# Patient Record
Sex: MALE | Race: ASIAN | NOT HISPANIC OR LATINO | Employment: FULL TIME | URBAN - METROPOLITAN AREA
[De-identification: names, ages, dates, MRNs, and addresses within clinical notes are randomized per-mention and may not be internally consistent; named-entity substitution may affect disease eponyms.]

---

## 2017-06-28 ENCOUNTER — TRANSCRIBE ORDERS (OUTPATIENT)
Dept: CARDIOLOGY | Facility: CLINIC | Age: 62
End: 2017-06-28

## 2017-06-28 DIAGNOSIS — R07.2 PRECORDIAL PAIN: Primary | ICD-10-CM

## 2017-06-28 DIAGNOSIS — R06.02 SOB (SHORTNESS OF BREATH): ICD-10-CM

## 2017-06-28 DIAGNOSIS — R77.8 TROPONIN I ABOVE REFERENCE RANGE: ICD-10-CM

## 2017-07-19 ENCOUNTER — HOSPITAL ENCOUNTER (OUTPATIENT)
Dept: CARDIOLOGY | Facility: HOSPITAL | Age: 62
Discharge: HOME | End: 2017-07-19
Attending: INTERNAL MEDICINE

## 2017-07-19 VITALS — DIASTOLIC BLOOD PRESSURE: 76 MMHG | HEART RATE: 52 BPM | SYSTOLIC BLOOD PRESSURE: 146 MMHG

## 2017-07-19 DIAGNOSIS — R06.02 SOB (SHORTNESS OF BREATH): ICD-10-CM

## 2017-07-19 DIAGNOSIS — R77.8 TROPONIN I ABOVE REFERENCE RANGE: ICD-10-CM

## 2017-07-19 DIAGNOSIS — R07.2 PRECORDIAL PAIN: ICD-10-CM

## 2017-07-19 LAB
BH CV STRESS BP STAGE 2: NORMAL
BH CV STRESS BP STAGE 3: NORMAL
BH CV STRESS DURATION MIN STAGE 1: 3
BH CV STRESS DURATION MIN STAGE 2: 3
BH CV STRESS DURATION MIN STAGE 3: 3
BH CV STRESS DURATION SEC STAGE 1: 0
BH CV STRESS DURATION SEC STAGE 2: 0
BH CV STRESS DURATION SEC STAGE 3: 0
BH CV STRESS GRADE STAGE 1: 10
BH CV STRESS GRADE STAGE 2: 12
BH CV STRESS GRADE STAGE 3: 14
BH CV STRESS HR STAGE 1: 98
BH CV STRESS HR STAGE 2: 121
BH CV STRESS HR STAGE 3: 142
BH CV STRESS METS STAGE 1: 4.6
BH CV STRESS METS STAGE 2: 7.5
BH CV STRESS METS STAGE 3: 10.2
BH CV STRESS PROTOCOL 1: NORMAL
BH CV STRESS RECOVERY BP: NORMAL MMHG
BH CV STRESS RECOVERY HR: 77 BPM
BH CV STRESS SPEED STAGE 1: 1.7
BH CV STRESS SPEED STAGE 2: 2.5
BH CV STRESS SPEED STAGE 3: 3.4
BH CV STRESS STAGE 1: 1
BH CV STRESS STAGE 2: 2
BH CV STRESS STAGE 3: 3
LV EF NUC BP: 64 %
MAXIMAL PREDICTED HEART RATE: 158 BPM
PERCENT MAX PREDICTED HR: 89.87 %
STRESS BASELINE BP: NORMAL MMHG
STRESS BASELINE HR: 52 BPM
STRESS PERCENT HR: 106 %
STRESS POST ESTIMATED WORKLOAD: 10.3 METS
STRESS POST EXERCISE DUR MIN: 9 MIN
STRESS POST EXERCISE DUR SEC: 0 SEC
STRESS POST PEAK BP: NORMAL MMHG
STRESS POST PEAK HR: 142 BPM
STRESS TARGET HR: 134 BPM

## 2017-07-19 PROCEDURE — 0 TECHNETIUM SESTAMIBI: Performed by: INTERNAL MEDICINE

## 2017-07-19 PROCEDURE — 78452 HT MUSCLE IMAGE SPECT MULT: CPT

## 2017-07-19 PROCEDURE — A9500 TC99M SESTAMIBI: HCPCS | Performed by: INTERNAL MEDICINE

## 2017-07-19 PROCEDURE — 93017 CV STRESS TEST TRACING ONLY: CPT

## 2017-07-19 RX ORDER — VALSARTAN 160 MG/1
160 TABLET ORAL DAILY
COMMUNITY
End: 2020-01-13 | Stop reason: SDUPTHER

## 2017-07-19 RX ORDER — AMLODIPINE BESYLATE 5 MG/1
5 TABLET ORAL DAILY
COMMUNITY
End: 2020-01-13

## 2017-07-19 RX ADMIN — Medication 1 DOSE: at 11:39

## 2017-07-19 RX ADMIN — Medication 1 DOSE: at 08:29

## 2020-01-13 ENCOUNTER — OFFICE VISIT (OUTPATIENT)
Dept: INTERNAL MEDICINE | Facility: CLINIC | Age: 65
End: 2020-01-13

## 2020-01-13 VITALS
DIASTOLIC BLOOD PRESSURE: 80 MMHG | HEIGHT: 71 IN | SYSTOLIC BLOOD PRESSURE: 142 MMHG | WEIGHT: 192 LBS | BODY MASS INDEX: 26.88 KG/M2

## 2020-01-13 DIAGNOSIS — G47.09 OTHER INSOMNIA: ICD-10-CM

## 2020-01-13 DIAGNOSIS — R97.20 INCREASED PROSTATE SPECIFIC ANTIGEN (PSA) VELOCITY: ICD-10-CM

## 2020-01-13 DIAGNOSIS — I10 ESSENTIAL HYPERTENSION: Primary | ICD-10-CM

## 2020-01-13 DIAGNOSIS — Z11.59 NEED FOR HEPATITIS C SCREENING TEST: ICD-10-CM

## 2020-01-13 DIAGNOSIS — R73.03 PREDIABETES: ICD-10-CM

## 2020-01-13 PROCEDURE — 90670 PCV13 VACCINE IM: CPT | Performed by: INTERNAL MEDICINE

## 2020-01-13 PROCEDURE — G0009 ADMIN PNEUMOCOCCAL VACCINE: HCPCS | Performed by: INTERNAL MEDICINE

## 2020-01-13 PROCEDURE — 99203 OFFICE O/P NEW LOW 30 MIN: CPT | Performed by: INTERNAL MEDICINE

## 2020-01-13 RX ORDER — AMLODIPINE BESYLATE 10 MG/1
10 TABLET ORAL DAILY
Qty: 30 TABLET | Refills: 3 | Status: SHIPPED | OUTPATIENT
Start: 2020-01-13 | End: 2021-02-05 | Stop reason: SDUPTHER

## 2020-01-13 RX ORDER — VALSARTAN 160 MG/1
160 TABLET ORAL DAILY
Qty: 30 TABLET | Refills: 3 | Status: SHIPPED | OUTPATIENT
Start: 2020-01-13 | End: 2020-11-20

## 2020-01-13 RX ORDER — ZOLPIDEM TARTRATE 10 MG/1
10 TABLET ORAL NIGHTLY PRN
Qty: 15 TABLET | Refills: 0 | Status: SHIPPED | OUTPATIENT
Start: 2020-01-13 | End: 2020-10-06 | Stop reason: SDUPTHER

## 2020-01-13 NOTE — PROGRESS NOTES
Subjective        Chief Complaint   Patient presents with   • Hypertension     fup bp meds refills            Guzman Sanders is a 64 y.o. male who presents for    Patient Active Problem List   Diagnosis   • Essential hypertension   • Prediabetes   • Increased prostate specific antigen (PSA) velocity   • Other insomnia       History of Present Illness     His BP at home has been 140/80 when he checks it. He plays golf and walks. He plays tennis some. He denies melena, hematochezia, nocturia, chest pain or dyspnea. He has not been checking his sugars. His diet has not been as good with the holidays. He flies to [place] about every 3 months and would like a refill on ambien. He started wearing hearing aids.  No Known Allergies    Current Outpatient Medications on File Prior to Visit   Medication Sig Dispense Refill   • [DISCONTINUED] amLODIPine (NORVASC) 5 MG tablet Take 5 mg by mouth Daily.     • [DISCONTINUED] valsartan (DIOVAN) 160 MG tablet Take 160 mg by mouth Daily.       No current facility-administered medications on file prior to visit.        Past Medical History:   Diagnosis Date   • Allergic    • Erectile dysfunction    • Hypertension    • Hypertriglyceridemia    • Increased prostate specific antigen (PSA) velocity    • Prediabetes        Past Surgical History:   Procedure Laterality Date   • COLONOSCOPY  12/17/2013   • HEMORRHOIDECTOMY     • HERNIA REPAIR         Family History   Problem Relation Age of Onset   • Other Mother         cerebral aneurysm   • Cancer Father         throat       Social History     Socioeconomic History   • Marital status:      Spouse name: Not on file   • Number of children: Not on file   • Years of education: Not on file   • Highest education level: Not on file   Tobacco Use   • Smoking status: Never Smoker   • Smokeless tobacco: Never Used   Substance and Sexual Activity   • Alcohol use: Yes     Frequency: 4 or more times a week     Drinks per session: 1 or 2     Comment:  "one drink per day   • Drug use: Never   • Sexual activity: Never           The following portions of the patient's history were reviewed and updated as appropriate: problem list, allergies, current medications, past medical history, past family history, past social history and past surgical history.    Review of Systems   Respiratory: Negative for shortness of breath.    Cardiovascular: Negative for chest pain.       Immunization History   Administered Date(s) Administered   • Flu Vaccine Split Quad 10/15/2018   • Fluzone High Dose =>65 Years (Vaxcare ONLY) 01/03/2020   • Pneumococcal Conjugate 13-Valent (PCV13) 01/13/2020   • Tdap 01/01/2013   • Zostavax 03/16/2015       Objective   Vitals:    01/13/20 0807   BP: 142/80   Weight: 87.1 kg (192 lb)   Height: 180.3 cm (71\")     Body mass index is 26.78 kg/m².  Physical Exam   Constitutional: He appears well-developed and well-nourished.   HENT:   Head: Normocephalic and atraumatic.   Neck: Carotid bruit is not present.   Cardiovascular: Normal rate, regular rhythm, S1 normal, S2 normal and normal heart sounds.   Pulmonary/Chest: Effort normal and breath sounds normal.   Neurological: He is alert.   Skin: Skin is warm.   Psychiatric: He has a normal mood and affect.   Vitals reviewed.      Procedures    Assessment/Plan   Guzman was seen today for hypertension.    Diagnoses and all orders for this visit:    Essential hypertension  -     Comprehensive Metabolic Panel; Future  -     Lipid Panel With / Chol / HDL Ratio; Future  -     amLODIPine (NORVASC) 10 MG tablet; Take 1 tablet by mouth Daily.  -     valsartan (DIOVAN) 160 MG tablet; Take 1 tablet by mouth Daily.    Prediabetes  -     Hemoglobin A1c; Future    Increased prostate specific antigen (PSA) velocity  -     PSA Screen; Future    Need for hepatitis C screening test  -     Hepatitis C Antibody; Future    Other insomnia  -     zolpidem (AMBIEN) 10 MG tablet; Take 1 tablet by mouth At Night As Needed for " Sleep.    Other orders  -     Pneumococcal Conjugate Vaccine 13-Valent All (PCV13)             Signed for old records. Prevnar. Increase Norvasc. Run HOLLIS; ambien for international travel.    Return in about 4 weeks (around 2/10/2020).

## 2020-01-14 ENCOUNTER — RESULTS ENCOUNTER (OUTPATIENT)
Dept: INTERNAL MEDICINE | Facility: CLINIC | Age: 65
End: 2020-01-14

## 2020-01-14 DIAGNOSIS — R73.03 PREDIABETES: ICD-10-CM

## 2020-01-14 DIAGNOSIS — Z11.59 NEED FOR HEPATITIS C SCREENING TEST: ICD-10-CM

## 2020-01-14 DIAGNOSIS — I10 ESSENTIAL HYPERTENSION: ICD-10-CM

## 2020-01-14 DIAGNOSIS — R97.20 INCREASED PROSTATE SPECIFIC ANTIGEN (PSA) VELOCITY: ICD-10-CM

## 2020-01-27 DIAGNOSIS — G47.09 OTHER INSOMNIA: ICD-10-CM

## 2020-01-27 RX ORDER — ZOLPIDEM TARTRATE 10 MG/1
10 TABLET ORAL NIGHTLY PRN
Qty: 15 TABLET | Refills: 0 | OUTPATIENT
Start: 2020-01-27

## 2020-02-12 ENCOUNTER — APPOINTMENT (OUTPATIENT)
Dept: LAB | Facility: HOSPITAL | Age: 65
End: 2020-02-12

## 2020-02-12 LAB
ALBUMIN SERPL-MCNC: 4.5 G/DL (ref 3.5–5.2)
ALBUMIN/GLOB SERPL: 2 G/DL
ALP SERPL-CCNC: 81 U/L (ref 39–117)
ALT SERPL W P-5'-P-CCNC: 21 U/L (ref 1–41)
ANION GAP SERPL CALCULATED.3IONS-SCNC: 11.2 MMOL/L (ref 5–15)
AST SERPL-CCNC: 18 U/L (ref 1–40)
BILIRUB SERPL-MCNC: 0.5 MG/DL (ref 0.1–1.2)
BUN BLD-MCNC: 13 MG/DL (ref 8–23)
BUN/CREAT SERPL: 14.6 (ref 7–25)
CALCIUM SPEC-SCNC: 9.1 MG/DL (ref 8.6–10.5)
CHLORIDE SERPL-SCNC: 105 MMOL/L (ref 98–107)
CHOLEST SERPL-MCNC: 187 MG/DL (ref 0–200)
CO2 SERPL-SCNC: 24.8 MMOL/L (ref 22–29)
CREAT BLD-MCNC: 0.89 MG/DL (ref 0.76–1.27)
GFR SERPL CREATININE-BSD FRML MDRD: 104 ML/MIN/1.73
GFR SERPL CREATININE-BSD FRML MDRD: 86 ML/MIN/1.73
GLOBULIN UR ELPH-MCNC: 2.2 GM/DL
GLUCOSE BLD-MCNC: 133 MG/DL (ref 65–99)
HBA1C MFR BLD: 6.11 % (ref 4.8–5.6)
HCV AB SER DONR QL: NORMAL
HDLC SERPL QL: 5.05
HDLC SERPL-MCNC: 37 MG/DL (ref 40–60)
LDLC SERPL CALC-MCNC: 118 MG/DL (ref 0–100)
POTASSIUM BLD-SCNC: 3.9 MMOL/L (ref 3.5–5.2)
PROT SERPL-MCNC: 6.7 G/DL (ref 6–8.5)
PSA SERPL-MCNC: 2.22 NG/ML (ref 0–4)
SODIUM BLD-SCNC: 141 MMOL/L (ref 136–145)
TRIGL SERPL-MCNC: 158 MG/DL (ref 0–150)
VLDLC SERPL-MCNC: 31.6 MG/DL (ref 5–40)

## 2020-02-12 PROCEDURE — 36415 COLL VENOUS BLD VENIPUNCTURE: CPT | Performed by: INTERNAL MEDICINE

## 2020-02-12 PROCEDURE — 80053 COMPREHEN METABOLIC PANEL: CPT | Performed by: INTERNAL MEDICINE

## 2020-02-12 PROCEDURE — 80061 LIPID PANEL: CPT | Performed by: INTERNAL MEDICINE

## 2020-02-12 PROCEDURE — 83036 HEMOGLOBIN GLYCOSYLATED A1C: CPT | Performed by: INTERNAL MEDICINE

## 2020-02-12 PROCEDURE — 86803 HEPATITIS C AB TEST: CPT | Performed by: INTERNAL MEDICINE

## 2020-02-12 PROCEDURE — G0103 PSA SCREENING: HCPCS | Performed by: INTERNAL MEDICINE

## 2020-02-17 ENCOUNTER — OFFICE VISIT (OUTPATIENT)
Dept: INTERNAL MEDICINE | Facility: CLINIC | Age: 65
End: 2020-02-17

## 2020-02-17 VITALS
HEIGHT: 71 IN | DIASTOLIC BLOOD PRESSURE: 84 MMHG | SYSTOLIC BLOOD PRESSURE: 128 MMHG | WEIGHT: 197 LBS | BODY MASS INDEX: 27.58 KG/M2

## 2020-02-17 DIAGNOSIS — I10 ESSENTIAL HYPERTENSION: Primary | ICD-10-CM

## 2020-02-17 DIAGNOSIS — R73.03 PREDIABETES: ICD-10-CM

## 2020-02-17 DIAGNOSIS — E78.49 OTHER HYPERLIPIDEMIA: ICD-10-CM

## 2020-02-17 PROCEDURE — 99213 OFFICE O/P EST LOW 20 MIN: CPT | Performed by: INTERNAL MEDICINE

## 2020-02-17 RX ORDER — ASPIRIN 81 MG/1
81 TABLET, CHEWABLE ORAL DAILY
COMMUNITY
End: 2021-12-02

## 2020-02-17 NOTE — PROGRESS NOTES
Subjective        Chief Complaint   Patient presents with   • Hypertension     4 week fup htn lab review            Guzman Sanders is a 64 y.o. male who presents for    Patient Active Problem List   Diagnosis   • Essential hypertension   • Prediabetes   • Other insomnia   • Other hyperlipidemia       History of Present Illness     He checked his BP  twice and he thinks it was 140s/80s.  He denies chest pain or dyspnea. He has had no problems with medication changes. There is no FH of diabetes. He has been eating more rice in the last 3 months.  No Known Allergies    Current Outpatient Medications on File Prior to Visit   Medication Sig Dispense Refill   • amLODIPine (NORVASC) 10 MG tablet Take 1 tablet by mouth Daily. 30 tablet 3   • aspirin 81 MG chewable tablet Chew 81 mg Daily.     • valsartan (DIOVAN) 160 MG tablet Take 1 tablet by mouth Daily. 30 tablet 3   • zolpidem (AMBIEN) 10 MG tablet Take 1 tablet by mouth At Night As Needed for Sleep. 15 tablet 0     No current facility-administered medications on file prior to visit.        Past Medical History:   Diagnosis Date   • Allergic    • Allergic rhinitis    • Benign essential hypertension    • Erectile dysfunction    • Hyperlipidemia    • Hypertension    • Hypertriglyceridemia    • Inability to attain erection    • Increased prostate specific antigen (PSA) velocity    • Prediabetes        Past Surgical History:   Procedure Laterality Date   • COLONOSCOPY  12/17/2013   • HEMORRHOIDECTOMY     • UMBILICAL HERNIA REPAIR         Family History   Problem Relation Age of Onset   • Other Mother         cerebral aneurysm   • Cancer Father         throat       Social History     Socioeconomic History   • Marital status:      Spouse name: Not on file   • Number of children: Not on file   • Years of education: Not on file   • Highest education level: Not on file   Tobacco Use   • Smoking status: Never Smoker   • Smokeless tobacco: Never Used   Substance and Sexual  "Activity   • Alcohol use: Yes     Frequency: 4 or more times a week     Drinks per session: 1 or 2     Comment: one drink per day   • Drug use: Never   • Sexual activity: Never           The following portions of the patient's history were reviewed and updated as appropriate: problem list, allergies, current medications, past medical history, past family history, past social history and past surgical history.    Review of Systems   Respiratory: Negative for shortness of breath.    Cardiovascular: Negative for chest pain.   Gastrointestinal: Negative for abdominal pain.       Immunization History   Administered Date(s) Administered   • DTaP 01/01/2013   • Flu Vaccine Split Quad 10/15/2018   • Fluzone High Dose =>65 Years (Vaxcare ONLY) 01/03/2020   • Pneumococcal Conjugate 13-Valent (PCV13) 01/13/2020   • Tdap 01/01/2013   • Zostavax 03/16/2015       Objective   Vitals:    02/17/20 0757   BP: 128/84   Weight: 89.4 kg (197 lb)   Height: 180.3 cm (71\")     Body mass index is 27.48 kg/m².  Physical Exam   Constitutional: He appears well-developed and well-nourished.   HENT:   Head: Normocephalic and atraumatic.   Neck: Carotid bruit is not present.   Cardiovascular: Normal rate, regular rhythm, S1 normal, S2 normal and normal heart sounds.   Pulmonary/Chest: Effort normal and breath sounds normal.   Genitourinary:   Genitourinary Comments: Declines prostate exam   Neurological: He is alert.   Skin: Skin is warm.   Psychiatric: He has a normal mood and affect.   Vitals reviewed.    Advance Care Planning   ACP discussion was held with the patient during this visit. Patient has an advance directive, copy requested.  Procedures    Assessment/Plan   Guzman was seen today for hypertension.    Diagnoses and all orders for this visit:    Essential hypertension    Prediabetes  -     Hemoglobin A1c; Future    Other hyperlipidemia  -     Comprehensive Metabolic Panel; Future  -     Lipid Panel With / Chol / HDL Ratio; " Future             Reviewed labs. PSA is stable. 10 year ASCVD risk is 16.7; I recc Crestor or Lipitor but he is not inclined at this point. He will upload his living will. BP is better. Discussed decreasing carbs and exercising more.    Return in about 6 months (around 8/17/2020) for Medicare Wellness.

## 2020-06-25 DIAGNOSIS — N52.9 ERECTILE DYSFUNCTION, UNSPECIFIED ERECTILE DYSFUNCTION TYPE: Primary | ICD-10-CM

## 2020-06-25 RX ORDER — SILDENAFIL 100 MG/1
TABLET, FILM COATED ORAL
Qty: 6 TABLET | Refills: 3 | Status: SHIPPED | OUTPATIENT
Start: 2020-06-25 | End: 2020-08-21 | Stop reason: SDUPTHER

## 2020-08-06 ENCOUNTER — RESULTS ENCOUNTER (OUTPATIENT)
Dept: INTERNAL MEDICINE | Facility: CLINIC | Age: 65
End: 2020-08-06

## 2020-08-06 DIAGNOSIS — R73.03 PREDIABETES: ICD-10-CM

## 2020-08-06 DIAGNOSIS — E78.49 OTHER HYPERLIPIDEMIA: ICD-10-CM

## 2020-08-18 ENCOUNTER — LAB (OUTPATIENT)
Dept: LAB | Facility: HOSPITAL | Age: 65
End: 2020-08-18

## 2020-08-18 LAB
ALBUMIN SERPL-MCNC: 4.5 G/DL (ref 3.5–5.2)
ALBUMIN/GLOB SERPL: 2.4 G/DL
ALP SERPL-CCNC: 76 U/L (ref 39–117)
ALT SERPL W P-5'-P-CCNC: 21 U/L (ref 1–41)
ANION GAP SERPL CALCULATED.3IONS-SCNC: 10.2 MMOL/L (ref 5–15)
AST SERPL-CCNC: 16 U/L (ref 1–40)
BILIRUB SERPL-MCNC: 0.5 MG/DL (ref 0–1.2)
BUN SERPL-MCNC: 14 MG/DL (ref 8–23)
BUN/CREAT SERPL: 16.3 (ref 7–25)
CALCIUM SPEC-SCNC: 9.1 MG/DL (ref 8.6–10.5)
CHLORIDE SERPL-SCNC: 107 MMOL/L (ref 98–107)
CHOLEST SERPL-MCNC: 200 MG/DL (ref 0–200)
CO2 SERPL-SCNC: 24.8 MMOL/L (ref 22–29)
CREAT SERPL-MCNC: 0.86 MG/DL (ref 0.76–1.27)
GFR SERPL CREATININE-BSD FRML MDRD: 108 ML/MIN/1.73
GFR SERPL CREATININE-BSD FRML MDRD: 89 ML/MIN/1.73
GLOBULIN UR ELPH-MCNC: 1.9 GM/DL
GLUCOSE SERPL-MCNC: 124 MG/DL (ref 65–99)
HBA1C MFR BLD: 5.8 % (ref 4.8–5.6)
HDLC SERPL QL: 4.88
HDLC SERPL-MCNC: 41 MG/DL (ref 40–60)
LDLC SERPL CALC-MCNC: 122 MG/DL (ref 0–100)
POTASSIUM SERPL-SCNC: 4.1 MMOL/L (ref 3.5–5.2)
PROT SERPL-MCNC: 6.4 G/DL (ref 6–8.5)
SODIUM SERPL-SCNC: 142 MMOL/L (ref 136–145)
TRIGL SERPL-MCNC: 183 MG/DL (ref 0–150)
VLDLC SERPL-MCNC: 36.6 MG/DL (ref 5–40)

## 2020-08-18 PROCEDURE — 36415 COLL VENOUS BLD VENIPUNCTURE: CPT | Performed by: INTERNAL MEDICINE

## 2020-08-18 PROCEDURE — 80053 COMPREHEN METABOLIC PANEL: CPT | Performed by: INTERNAL MEDICINE

## 2020-08-18 PROCEDURE — 83036 HEMOGLOBIN GLYCOSYLATED A1C: CPT | Performed by: INTERNAL MEDICINE

## 2020-08-18 PROCEDURE — 80061 LIPID PANEL: CPT | Performed by: INTERNAL MEDICINE

## 2020-08-21 ENCOUNTER — OFFICE VISIT (OUTPATIENT)
Dept: INTERNAL MEDICINE | Facility: CLINIC | Age: 65
End: 2020-08-21

## 2020-08-21 VITALS
DIASTOLIC BLOOD PRESSURE: 76 MMHG | SYSTOLIC BLOOD PRESSURE: 122 MMHG | TEMPERATURE: 97.9 F | HEIGHT: 71 IN | WEIGHT: 190 LBS | BODY MASS INDEX: 26.6 KG/M2

## 2020-08-21 DIAGNOSIS — R73.03 PREDIABETES: ICD-10-CM

## 2020-08-21 DIAGNOSIS — N52.9 ERECTILE DYSFUNCTION, UNSPECIFIED ERECTILE DYSFUNCTION TYPE: ICD-10-CM

## 2020-08-21 DIAGNOSIS — Z00.00 INITIAL MEDICARE ANNUAL WELLNESS VISIT: Primary | ICD-10-CM

## 2020-08-21 DIAGNOSIS — E78.49 OTHER HYPERLIPIDEMIA: ICD-10-CM

## 2020-08-21 DIAGNOSIS — I10 ESSENTIAL HYPERTENSION: ICD-10-CM

## 2020-08-21 PROCEDURE — G0438 PPPS, INITIAL VISIT: HCPCS | Performed by: INTERNAL MEDICINE

## 2020-08-21 RX ORDER — SILDENAFIL 100 MG/1
TABLET, FILM COATED ORAL
Qty: 20 TABLET | Refills: 3 | Status: SHIPPED | OUTPATIENT
Start: 2020-08-21 | End: 2020-08-21 | Stop reason: SDUPTHER

## 2020-08-21 RX ORDER — ATORVASTATIN CALCIUM 20 MG/1
20 TABLET, FILM COATED ORAL DAILY
Qty: 30 TABLET | Refills: 3 | Status: SHIPPED | OUTPATIENT
Start: 2020-08-21 | End: 2020-08-21 | Stop reason: SDUPTHER

## 2020-08-21 RX ORDER — SILDENAFIL 100 MG/1
TABLET, FILM COATED ORAL
Qty: 20 TABLET | Refills: 3 | Status: SHIPPED | OUTPATIENT
Start: 2020-08-21 | End: 2020-09-15 | Stop reason: SDUPTHER

## 2020-08-21 RX ORDER — ATORVASTATIN CALCIUM 20 MG/1
20 TABLET, FILM COATED ORAL DAILY
Qty: 30 TABLET | Refills: 3 | Status: SHIPPED | OUTPATIENT
Start: 2020-08-21 | End: 2020-11-20

## 2020-08-21 NOTE — PROGRESS NOTES
The ABCs of the Annual Wellness Visit  Subsequent Medicare Wellness Visit    Chief Complaint   Patient presents with   • Medicare Wellness-subsequent   • Hypertension       Subjective   History of Present Illness:  Guzman Sanders is a 64 y.o. male who presents for a Subsequent Medicare Wellness Visit.  He checks his BP and it runs 120/80. He has not been checking his sugars. He is exercising 5 days per week with tennis and walking to play golf. He denies chest pain dyspnea. He has lost 10 pounds on purpose. He is not taking a statin.  HEALTH RISK ASSESSMENT    Recent Hospitalizations:  No hospitalization(s) within the last year.    Current Medical Providers:  Patient Care Team:  Tucker Zuniga MD as PCP - General (Internal Medicine)    Smoking Status:  Social History     Tobacco Use   Smoking Status Never Smoker   Smokeless Tobacco Never Used       Alcohol Consumption:  Social History     Substance and Sexual Activity   Alcohol Use Yes   • Frequency: 4 or more times a week   • Drinks per session: 1 or 2    Comment: one drink per day       Depression Screen:   PHQ-2/PHQ-9 Depression Screening 8/21/2020   Little interest or pleasure in doing things 0   Feeling down, depressed, or hopeless 0   Total Score 0       Fall Risk Screen:  STEADI Fall Risk Assessment was completed, and patient is at LOW risk for falls.Assessment completed on:8/21/2020    Health Habits and Functional and Cognitive Screening:  Functional & Cognitive Status 8/21/2020   Do you have difficulty preparing food and eating? No   Do you have difficulty bathing yourself, getting dressed or grooming yourself? No   Do you have difficulty using the toilet? No   Do you have difficulty moving around from place to place? No   Do you have trouble with steps or getting out of a bed or a chair? No   Current Diet Well Balanced Diet   Dental Exam Up to date   Eye Exam Up to date   Exercise (times per week) 5 times per week   Current Exercise Activities Include  Walking   Do you need help using the phone?  No   Are you deaf or do you have serious difficulty hearing?  No   Do you need help with transportation? No   Do you need help shopping? No   Do you need help preparing meals?  No   Do you need help with housework?  No   Do you need help with laundry? No   Do you need help taking your medications? No   Do you need help managing money? No   Do you ever drive or ride in a car without wearing a seat belt? No   Have you felt unusual stress, anger or loneliness in the last month? No   Who do you live with? Spouse   If you need help, do you have trouble finding someone available to you? No   Have you been bothered in the last four weeks by sexual problems? No   Do you have difficulty concentrating, remembering or making decisions? No         Does the patient have evidence of cognitive impairment? No    Asprin use counseling:Taking ASA appropriately as indicated    Age-appropriate Screening Schedule:  Refer to the list below for future screening recommendations based on patient's age, sex and/or medical conditions. Orders for these recommended tests are listed in the plan section. The patient has been provided with a written plan.    Health Maintenance   Topic Date Due   • ZOSTER VACCINE (2 of 3) 05/11/2015   • INFLUENZA VACCINE  08/01/2020   • LIPID PANEL  08/18/2021   • TDAP/TD VACCINES (2 - Td) 01/01/2023   • COLONOSCOPY  12/17/2023          The following portions of the patient's history were reviewed and updated as appropriate: allergies, current medications, past family history, past medical history, past social history, past surgical history and problem list.    Outpatient Medications Prior to Visit   Medication Sig Dispense Refill   • amLODIPine (NORVASC) 10 MG tablet Take 1 tablet by mouth Daily. 30 tablet 3   • aspirin 81 MG chewable tablet Chew 81 mg Daily.     • valsartan (DIOVAN) 160 MG tablet Take 1 tablet by mouth Daily. 30 tablet 3   • zolpidem (AMBIEN) 10 MG  "tablet Take 1 tablet by mouth At Night As Needed for Sleep. 15 tablet 0   • sildenafil (Viagra) 100 MG tablet Take 1/2 tablet QD PRN 6 tablet 3     No facility-administered medications prior to visit.        Patient Active Problem List   Diagnosis   • Essential hypertension   • Prediabetes   • Other insomnia   • Other hyperlipidemia   • Initial Medicare annual wellness visit       Advanced Care Planning:  ACP discussion was held with the patient during this visit. Patient has an advance directive (not in EMR), copy requested.    Review of Systems   Respiratory: Negative for shortness of breath.    Cardiovascular: Negative for chest pain.       Compared to one year ago, the patient feels his physical health is better.  Compared to one year ago, the patient feels his mental health is the same.    Reviewed chart for potential of high risk medication in the elderly: yes  Reviewed chart for potential of harmful drug interactions in the elderly:yes    Objective         Vitals:    08/21/20 0830   BP: 122/76   Temp: 97.9 °F (36.6 °C)   Weight: 86.2 kg (190 lb)   Height: 180.3 cm (71\")       Body mass index is 26.5 kg/m².  Discussed the patient's BMI with him. The BMI is in the acceptable range.    Physical Exam   Constitutional: He appears well-developed and well-nourished.   Neck: Carotid bruit is not present.   Cardiovascular: Normal rate, regular rhythm and normal heart sounds.   Pulmonary/Chest: Effort normal and breath sounds normal.   Neurological: He is alert.   Skin: Skin is warm.   Psychiatric: He has a normal mood and affect.   Nursing note and vitals reviewed.      Lab Results   Component Value Date    TRIG 183 (H) 08/18/2020    HDL 41 08/18/2020     (H) 08/18/2020    VLDL 36.6 08/18/2020    HGBA1C 5.80 (H) 08/18/2020        Assessment/Plan   Medicare Risks and Personalized Health Plan  CMS Preventative Services Quick Reference  Advance Directive Discussion  Immunizations Discussed/Encouraged (specific " immunizations; Shingrix )    The above risks/problems have been discussed with the patient.  Pertinent information has been shared with the patient in the After Visit Summary.  Follow up plans and orders are seen below in the Assessment/Plan Section.    Diagnoses and all orders for this visit:    1. Initial Medicare annual wellness visit (Primary)    2. Other hyperlipidemia  -     atorvastatin (LIPITOR) 20 MG tablet; Take 1 tablet by mouth Daily.  Dispense: 30 tablet; Refill: 3  -     Comprehensive Metabolic Panel; Future  -     Lipid Panel With / Chol / HDL Ratio; Future    3. Essential hypertension    4. Erectile dysfunction, unspecified erectile dysfunction type  -     sildenafil (Viagra) 100 MG tablet; Take 1/2 tablet QD PRN  Dispense: 20 tablet; Refill: 3    5. Prediabetes  -     Hemoglobin A1c; Future      Follow Up:  Return in about 3 months (around 11/21/2020), or 30 minutes.     An After Visit Summary and PPPS were given to the patient.         Labs reviewed. Recc Shingrix. He is agreeable to a statin; add Lipitor after discussing side effects. BP is good.

## 2020-09-15 DIAGNOSIS — N52.9 ERECTILE DYSFUNCTION, UNSPECIFIED ERECTILE DYSFUNCTION TYPE: ICD-10-CM

## 2020-09-15 RX ORDER — SILDENAFIL 100 MG/1
TABLET, FILM COATED ORAL
Qty: 20 TABLET | Refills: 3 | Status: SHIPPED | OUTPATIENT
Start: 2020-09-15 | End: 2021-08-24 | Stop reason: SDUPTHER

## 2020-09-15 RX ORDER — VALSARTAN 320 MG/1
TABLET ORAL
Qty: 30 TABLET | Refills: 3 | Status: SHIPPED | OUTPATIENT
Start: 2020-09-15 | End: 2021-07-06

## 2020-10-06 DIAGNOSIS — G47.09 OTHER INSOMNIA: ICD-10-CM

## 2020-10-06 RX ORDER — ZOLPIDEM TARTRATE 10 MG/1
10 TABLET ORAL NIGHTLY PRN
Qty: 15 TABLET | Refills: 0 | Status: SHIPPED | OUTPATIENT
Start: 2020-10-06 | End: 2020-11-20

## 2020-11-18 ENCOUNTER — LAB (OUTPATIENT)
Dept: LAB | Facility: HOSPITAL | Age: 65
End: 2020-11-18

## 2020-11-18 LAB
ALBUMIN SERPL-MCNC: 4.4 G/DL (ref 3.5–5.2)
ALBUMIN/GLOB SERPL: 1.8 G/DL
ALP SERPL-CCNC: 80 U/L (ref 39–117)
ALT SERPL W P-5'-P-CCNC: 22 U/L (ref 1–41)
ANION GAP SERPL CALCULATED.3IONS-SCNC: 6.6 MMOL/L (ref 5–15)
AST SERPL-CCNC: 21 U/L (ref 1–40)
BILIRUB SERPL-MCNC: 0.7 MG/DL (ref 0–1.2)
BUN SERPL-MCNC: 7 MG/DL (ref 8–23)
BUN/CREAT SERPL: 7.4 (ref 7–25)
CALCIUM SPEC-SCNC: 8.9 MG/DL (ref 8.6–10.5)
CHLORIDE SERPL-SCNC: 106 MMOL/L (ref 98–107)
CHOLEST SERPL-MCNC: 197 MG/DL (ref 0–200)
CO2 SERPL-SCNC: 29.4 MMOL/L (ref 22–29)
CREAT SERPL-MCNC: 0.94 MG/DL (ref 0.76–1.27)
GFR SERPL CREATININE-BSD FRML MDRD: 80 ML/MIN/1.73
GFR SERPL CREATININE-BSD FRML MDRD: 97 ML/MIN/1.73
GLOBULIN UR ELPH-MCNC: 2.4 GM/DL
GLUCOSE SERPL-MCNC: 138 MG/DL (ref 65–99)
HBA1C MFR BLD: 5.8 % (ref 4.8–5.6)
HDLC SERPL QL: 3.72
HDLC SERPL-MCNC: 53 MG/DL (ref 40–60)
LDLC SERPL CALC-MCNC: 122 MG/DL (ref 0–100)
POTASSIUM SERPL-SCNC: 4.5 MMOL/L (ref 3.5–5.2)
PROT SERPL-MCNC: 6.8 G/DL (ref 6–8.5)
SODIUM SERPL-SCNC: 142 MMOL/L (ref 136–145)
TRIGL SERPL-MCNC: 126 MG/DL (ref 0–150)
VLDLC SERPL-MCNC: 22 MG/DL (ref 5–40)

## 2020-11-18 PROCEDURE — 83036 HEMOGLOBIN GLYCOSYLATED A1C: CPT | Performed by: INTERNAL MEDICINE

## 2020-11-18 PROCEDURE — 80053 COMPREHEN METABOLIC PANEL: CPT | Performed by: INTERNAL MEDICINE

## 2020-11-18 PROCEDURE — 80061 LIPID PANEL: CPT | Performed by: INTERNAL MEDICINE

## 2020-11-18 PROCEDURE — 36415 COLL VENOUS BLD VENIPUNCTURE: CPT | Performed by: INTERNAL MEDICINE

## 2020-11-19 ENCOUNTER — RESULTS ENCOUNTER (OUTPATIENT)
Dept: INTERNAL MEDICINE | Facility: CLINIC | Age: 65
End: 2020-11-19

## 2020-11-19 DIAGNOSIS — E78.49 OTHER HYPERLIPIDEMIA: ICD-10-CM

## 2020-11-19 DIAGNOSIS — R73.03 PREDIABETES: ICD-10-CM

## 2020-11-20 ENCOUNTER — OFFICE VISIT (OUTPATIENT)
Dept: INTERNAL MEDICINE | Facility: CLINIC | Age: 65
End: 2020-11-20

## 2020-11-20 VITALS
WEIGHT: 186 LBS | BODY MASS INDEX: 26.04 KG/M2 | TEMPERATURE: 97 F | SYSTOLIC BLOOD PRESSURE: 126 MMHG | HEIGHT: 71 IN | DIASTOLIC BLOOD PRESSURE: 78 MMHG

## 2020-11-20 DIAGNOSIS — E78.49 OTHER HYPERLIPIDEMIA: ICD-10-CM

## 2020-11-20 DIAGNOSIS — R73.03 PREDIABETES: ICD-10-CM

## 2020-11-20 DIAGNOSIS — G47.09 OTHER INSOMNIA: ICD-10-CM

## 2020-11-20 DIAGNOSIS — Z12.5 SCREENING FOR PROSTATE CANCER: ICD-10-CM

## 2020-11-20 DIAGNOSIS — I10 ESSENTIAL HYPERTENSION: Primary | ICD-10-CM

## 2020-11-20 PROBLEM — Z00.00 INITIAL MEDICARE ANNUAL WELLNESS VISIT: Status: RESOLVED | Noted: 2020-08-21 | Resolved: 2020-11-20

## 2020-11-20 PROCEDURE — 99214 OFFICE O/P EST MOD 30 MIN: CPT | Performed by: INTERNAL MEDICINE

## 2020-11-20 RX ORDER — ROSUVASTATIN CALCIUM 5 MG/1
5 TABLET, COATED ORAL DAILY
Qty: 30 TABLET | Refills: 3 | Status: SHIPPED | OUTPATIENT
Start: 2020-11-20 | End: 2021-01-15 | Stop reason: SDUPTHER

## 2021-01-15 DIAGNOSIS — E78.49 OTHER HYPERLIPIDEMIA: ICD-10-CM

## 2021-01-15 RX ORDER — ROSUVASTATIN CALCIUM 5 MG/1
5 TABLET, COATED ORAL DAILY
Qty: 30 TABLET | Refills: 3 | Status: SHIPPED | OUTPATIENT
Start: 2021-01-15 | End: 2021-05-23 | Stop reason: SDUPTHER

## 2021-02-05 ENCOUNTER — TELEPHONE (OUTPATIENT)
Dept: INTERNAL MEDICINE | Facility: CLINIC | Age: 66
End: 2021-02-05

## 2021-02-05 DIAGNOSIS — I10 ESSENTIAL HYPERTENSION: ICD-10-CM

## 2021-02-05 RX ORDER — AMLODIPINE BESYLATE 10 MG/1
10 TABLET ORAL DAILY
Qty: 30 TABLET | Refills: 3 | Status: SHIPPED | OUTPATIENT
Start: 2021-02-05 | End: 2021-07-06

## 2021-02-05 NOTE — TELEPHONE ENCOUNTER
Caller: [facility] - [place] - [address] [facility] [address] - [phone] [facility] [phone] FX    Relationship: Pharmacy    Best call back number: [phone]    Medication needed:   Requested Prescriptions     Pending Prescriptions Disp Refills   • amLODIPine (NORVASC) 10 MG tablet 30 tablet 3     Sig: Take 1 tablet by mouth Daily.       When do you need the refill by: asap    What details did the patient provide when requesting the medication: [place] [facility] will now be the preferred pharmacy    Does the patient have less than a 3 day supply:  [x] Yes  [] No    What is the patient's preferred pharmacy:     [facility] [place], [place] - [address] [facility] [address] - [phone] [facility] [phone] FX

## 2021-02-12 DIAGNOSIS — Z12.5 SCREENING FOR PROSTATE CANCER: ICD-10-CM

## 2021-02-12 DIAGNOSIS — I10 ESSENTIAL HYPERTENSION: ICD-10-CM

## 2021-02-12 DIAGNOSIS — R73.03 PREDIABETES: ICD-10-CM

## 2021-02-12 DIAGNOSIS — E78.49 OTHER HYPERLIPIDEMIA: ICD-10-CM

## 2021-02-17 ENCOUNTER — LAB (OUTPATIENT)
Dept: LAB | Facility: HOSPITAL | Age: 66
End: 2021-02-17

## 2021-02-17 LAB
ALBUMIN SERPL-MCNC: 4.2 G/DL (ref 3.5–5.2)
ALBUMIN/GLOB SERPL: 2 G/DL
ALP SERPL-CCNC: 79 U/L (ref 39–117)
ALT SERPL W P-5'-P-CCNC: 26 U/L (ref 1–41)
ANION GAP SERPL CALCULATED.3IONS-SCNC: 8.9 MMOL/L (ref 5–15)
AST SERPL-CCNC: 17 U/L (ref 1–40)
BILIRUB SERPL-MCNC: 0.6 MG/DL (ref 0–1.2)
BUN SERPL-MCNC: 19 MG/DL (ref 8–23)
BUN/CREAT SERPL: 20.9 (ref 7–25)
CALCIUM SPEC-SCNC: 8.7 MG/DL (ref 8.6–10.5)
CHLORIDE SERPL-SCNC: 106 MMOL/L (ref 98–107)
CHOLEST SERPL-MCNC: 129 MG/DL (ref 0–200)
CO2 SERPL-SCNC: 26.1 MMOL/L (ref 22–29)
CREAT SERPL-MCNC: 0.91 MG/DL (ref 0.76–1.27)
GFR SERPL CREATININE-BSD FRML MDRD: 101 ML/MIN/1.73
GFR SERPL CREATININE-BSD FRML MDRD: 83 ML/MIN/1.73
GLOBULIN UR ELPH-MCNC: 2.1 GM/DL
GLUCOSE SERPL-MCNC: 125 MG/DL (ref 65–99)
HBA1C MFR BLD: 6 % (ref 4.8–5.6)
HDLC SERPL QL: 2.87
HDLC SERPL-MCNC: 45 MG/DL (ref 40–60)
LDLC SERPL CALC-MCNC: 64 MG/DL (ref 0–100)
POTASSIUM SERPL-SCNC: 4.1 MMOL/L (ref 3.5–5.2)
PROT SERPL-MCNC: 6.3 G/DL (ref 6–8.5)
PSA SERPL-MCNC: 2.2 NG/ML (ref 0–4)
SODIUM SERPL-SCNC: 141 MMOL/L (ref 136–145)
TRIGL SERPL-MCNC: 106 MG/DL (ref 0–150)
VLDLC SERPL-MCNC: 20 MG/DL (ref 5–40)

## 2021-02-17 PROCEDURE — 80053 COMPREHEN METABOLIC PANEL: CPT | Performed by: INTERNAL MEDICINE

## 2021-02-17 PROCEDURE — 83036 HEMOGLOBIN GLYCOSYLATED A1C: CPT | Performed by: INTERNAL MEDICINE

## 2021-02-17 PROCEDURE — 80061 LIPID PANEL: CPT | Performed by: INTERNAL MEDICINE

## 2021-02-17 PROCEDURE — 36415 COLL VENOUS BLD VENIPUNCTURE: CPT

## 2021-02-17 PROCEDURE — G0103 PSA SCREENING: HCPCS | Performed by: INTERNAL MEDICINE

## 2021-02-19 ENCOUNTER — OFFICE VISIT (OUTPATIENT)
Dept: INTERNAL MEDICINE | Facility: CLINIC | Age: 66
End: 2021-02-19

## 2021-02-19 VITALS
BODY MASS INDEX: 26.32 KG/M2 | HEIGHT: 71 IN | SYSTOLIC BLOOD PRESSURE: 122 MMHG | DIASTOLIC BLOOD PRESSURE: 84 MMHG | TEMPERATURE: 97.8 F | WEIGHT: 188 LBS

## 2021-02-19 DIAGNOSIS — R73.03 PREDIABETES: Chronic | ICD-10-CM

## 2021-02-19 DIAGNOSIS — E78.49 OTHER HYPERLIPIDEMIA: Chronic | ICD-10-CM

## 2021-02-19 DIAGNOSIS — R06.83 SNORING: ICD-10-CM

## 2021-02-19 DIAGNOSIS — I10 ESSENTIAL HYPERTENSION: Primary | Chronic | ICD-10-CM

## 2021-02-19 PROCEDURE — 99214 OFFICE O/P EST MOD 30 MIN: CPT | Performed by: INTERNAL MEDICINE

## 2021-02-19 NOTE — PROGRESS NOTES
Subjective        Chief Complaint   Patient presents with   • Hypertension           Guzman Sanders is a 65 y.o. male who presents for    Patient Active Problem List   Diagnosis   • Essential hypertension   • Prediabetes   • Other insomnia   • Other hyperlipidemia       History of Present Illness     He has not been exercising as much with the weather. He has not checked his BP. He denies chest pain, dyspnea, nausea or abdominal pain. He has no side effects with Crestor. He sleeps 6.5 to 7 hours per night. He wakes up around 4 am and he has a hard time falling back asleep. He does not wake up to urinate. He does not report apnea. His wife says he snores. He does not wake up rested. He has never had a sleep study.  No Known Allergies    Current Outpatient Medications on File Prior to Visit   Medication Sig Dispense Refill   • amLODIPine (NORVASC) 10 MG tablet Take 1 tablet by mouth Daily. 30 tablet 3   • aspirin 81 MG chewable tablet Chew 81 mg Daily.     • rosuvastatin (Crestor) 5 MG tablet Take 1 tablet by mouth Daily. 30 tablet 3   • sildenafil (Viagra) 100 MG tablet Take 1/2 tablet QD PRN 20 tablet 3   • valsartan (DIOVAN) 320 MG tablet TAKE 1/2 TABLET BY MOUTH EVERY DAY 30 tablet 3     No current facility-administered medications on file prior to visit.        Past Medical History:   Diagnosis Date   • Allergic rhinitis    • Erectile dysfunction    • Inability to attain erection        Past Surgical History:   Procedure Laterality Date   • COLONOSCOPY  12/17/2013   • HEMORRHOIDECTOMY     • UMBILICAL HERNIA REPAIR         Family History   Problem Relation Age of Onset   • Other Mother         cerebral aneurysm   • Cancer Father         throat       Social History     Socioeconomic History   • Marital status:      Spouse name: Not on file   • Number of children: Not on file   • Years of education: Not on file   • Highest education level: Not on file   Tobacco Use   • Smoking status: Never Smoker   •  "Smokeless tobacco: Never Used   Substance and Sexual Activity   • Alcohol use: Yes     Frequency: 4 or more times a week     Drinks per session: 1 or 2     Comment: one drink per day   • Drug use: Never   • Sexual activity: Never       Reviewed cmp, flp, psa, and a1c.    The following portions of the patient's history were reviewed and updated as appropriate: problem list, allergies, current medications, past medical history, past family history, past social history and past surgical history.    Review of Systems    Immunization History   Administered Date(s) Administered   • COVID-19 (PFIZER) 01/28/2021, 02/17/2021   • DTaP 01/01/2013   • Flu Vaccine Split Quad 10/15/2018   • Fluzone High Dose =>65 Years (Vaxcare ONLY) 01/03/2020, 08/23/2020   • Pneumococcal Conjugate 13-Valent (PCV13) 01/13/2020   • Tdap 01/01/2013   • Zostavax 03/16/2015       Objective   Vitals:    02/19/21 0901   BP: 122/84   Temp: 97.8 °F (36.6 °C)   Weight: 85.3 kg (188 lb)   Height: 180.3 cm (71\")     Body mass index is 26.22 kg/m².  Physical Exam  Vitals signs reviewed.   Constitutional:       Appearance: He is well-developed.   HENT:      Head: Normocephalic and atraumatic.   Cardiovascular:      Rate and Rhythm: Normal rate and regular rhythm.      Heart sounds: Normal heart sounds, S1 normal and S2 normal.   Pulmonary:      Effort: Pulmonary effort is normal.      Breath sounds: Normal breath sounds.   Skin:     General: Skin is warm.   Neurological:      Mental Status: He is alert.   Psychiatric:         Behavior: Behavior normal.         Procedures    Assessment/Plan   Diagnoses and all orders for this visit:    1. Essential hypertension (Primary)    2. Other hyperlipidemia    3. Prediabetes  -     Comprehensive Metabolic Panel; Future  -     Hemoglobin A1c; Future    4. Snoring  -     Ambulatory Referral to Sleep Medicine               LDL is excellent. A1c is stable. BP is good. He will schedule pneumovax 23 in 2 weeks. Get sleep " eval since does not wake up rested.  Return in about 6 months (around 8/19/2021) for Medicare Wellness, Lab Before FUP.

## 2021-03-04 ENCOUNTER — APPOINTMENT (OUTPATIENT)
Dept: SLEEP MEDICINE | Facility: HOSPITAL | Age: 66
End: 2021-03-04

## 2021-05-23 DIAGNOSIS — E78.49 OTHER HYPERLIPIDEMIA: ICD-10-CM

## 2021-05-24 RX ORDER — ROSUVASTATIN CALCIUM 5 MG/1
5 TABLET, COATED ORAL DAILY
Qty: 90 TABLET | Refills: 0 | Status: SHIPPED | OUTPATIENT
Start: 2021-05-24 | End: 2021-08-24

## 2021-07-06 DIAGNOSIS — I10 ESSENTIAL HYPERTENSION: ICD-10-CM

## 2021-07-06 RX ORDER — VALSARTAN 320 MG/1
TABLET ORAL
Qty: 30 TABLET | Refills: 3 | Status: SHIPPED | OUTPATIENT
Start: 2021-07-06 | End: 2021-12-02

## 2021-07-06 RX ORDER — AMLODIPINE BESYLATE 10 MG/1
10 TABLET ORAL DAILY
Qty: 30 TABLET | Refills: 3 | Status: SHIPPED | OUTPATIENT
Start: 2021-07-06 | End: 2021-10-26

## 2021-08-24 DIAGNOSIS — N52.9 ERECTILE DYSFUNCTION, UNSPECIFIED ERECTILE DYSFUNCTION TYPE: ICD-10-CM

## 2021-08-24 DIAGNOSIS — E78.49 OTHER HYPERLIPIDEMIA: ICD-10-CM

## 2021-08-24 RX ORDER — SILDENAFIL 100 MG/1
TABLET, FILM COATED ORAL
Qty: 30 TABLET | Refills: 3 | Status: SHIPPED | OUTPATIENT
Start: 2021-08-24 | End: 2022-02-10 | Stop reason: SDUPTHER

## 2021-08-24 RX ORDER — ROSUVASTATIN CALCIUM 5 MG/1
TABLET, COATED ORAL
Qty: 90 TABLET | Refills: 0 | Status: SHIPPED | OUTPATIENT
Start: 2021-08-24 | End: 2021-12-13 | Stop reason: SDUPTHER

## 2021-09-10 ENCOUNTER — OFFICE VISIT (OUTPATIENT)
Dept: INTERNAL MEDICINE | Facility: CLINIC | Age: 66
End: 2021-09-10

## 2021-09-10 VITALS
TEMPERATURE: 97.8 F | HEIGHT: 71 IN | SYSTOLIC BLOOD PRESSURE: 152 MMHG | BODY MASS INDEX: 25.06 KG/M2 | WEIGHT: 179 LBS | DIASTOLIC BLOOD PRESSURE: 80 MMHG

## 2021-09-10 DIAGNOSIS — I10 ESSENTIAL HYPERTENSION: Chronic | ICD-10-CM

## 2021-09-10 DIAGNOSIS — Z78.9 ALCOHOL USE: ICD-10-CM

## 2021-09-10 DIAGNOSIS — R73.03 PREDIABETES: Primary | Chronic | ICD-10-CM

## 2021-09-10 PROCEDURE — 99214 OFFICE O/P EST MOD 30 MIN: CPT | Performed by: INTERNAL MEDICINE

## 2021-09-10 RX ORDER — HYDROCHLOROTHIAZIDE 12.5 MG/1
12.5 TABLET ORAL DAILY
Qty: 30 TABLET | Refills: 2 | Status: SHIPPED | OUTPATIENT
Start: 2021-09-10 | End: 2021-09-10

## 2021-09-10 NOTE — PROGRESS NOTES
Subjective        Chief Complaint   Patient presents with   • Hypertension           Guzman Sanders is a 66 y.o. male who presents for    Patient Active Problem List   Diagnosis   • Essential hypertension   • Prediabetes   • Other insomnia   • Other hyperlipidemia       History of Present Illness     His BP has been 135/75. He denies chest pain or dyspnea. He saw ortho for his left ankle pain that is going on for a long time. He had an MRI that showed tendonitis. He was prescribed Pennsaid. Distant sounds are too loud. Close sounds are harder to register. He had two matrinis last night. He has increased his alcohol to 3-6 ounces per day in the last 6 months. He is positive on C and G of CAGE questions.   No Known Allergies    Current Outpatient Medications on File Prior to Visit   Medication Sig Dispense Refill   • amLODIPine (NORVASC) 10 MG tablet Take 1 tablet by mouth Daily. 30 tablet 3   • aspirin 81 MG chewable tablet Chew 81 mg Daily.     • rosuvastatin (CRESTOR) 5 MG tablet TAKE ONE TABLET BY MOUTH DAILY 90 tablet 0   • sildenafil (Viagra) 100 MG tablet Take 1/2 tablet QD PRN 30 tablet 3   • valsartan (DIOVAN) 320 MG tablet TAKE 1/2 TABLET BY MOUTH EVERY DAY 30 tablet 3     No current facility-administered medications on file prior to visit.       Past Medical History:   Diagnosis Date   • Allergic rhinitis    • Erectile dysfunction    • Inability to attain erection        Past Surgical History:   Procedure Laterality Date   • COLONOSCOPY  12/17/2013   • HEMORRHOIDECTOMY     • UMBILICAL HERNIA REPAIR         Family History   Problem Relation Age of Onset   • Other Mother         cerebral aneurysm   • Cancer Father         throat       Social History     Socioeconomic History   • Marital status:      Spouse name: Not on file   • Number of children: Not on file   • Years of education: Not on file   • Highest education level: Not on file   Tobacco Use   • Smoking status: Never Smoker   • Smokeless tobacco:  "Never Used   Substance and Sexual Activity   • Alcohol use: Yes     Comment: 3-6 ounces of alcohol per day   • Drug use: Never   • Sexual activity: Never           The following portions of the patient's history were reviewed and updated as appropriate: problem list, allergies, current medications, past medical history, past family history, past social history and past surgical history.    Review of Systems    Immunization History   Administered Date(s) Administered   • COVID-19 (PFIZER) 01/28/2021, 02/17/2021   • DTaP 01/01/2013   • Flu Vaccine Split Quad 10/15/2018   • Fluzone High Dose =>65 Years (Vaxcare ONLY) 01/03/2020, 08/23/2020   • Pneumococcal Conjugate 13-Valent (PCV13) 01/13/2020   • Pneumococcal Polysaccharide (PPSV23) 03/04/2021   • Tdap 01/01/2013   • Zostavax 03/16/2015       Objective   Vitals:    09/10/21 0839   BP: 152/80   Temp: 97.8 °F (36.6 °C)   Weight: 81.2 kg (179 lb)   Height: 180.3 cm (71\")     Body mass index is 24.97 kg/m².  Physical Exam  Vitals reviewed.   Constitutional:       Appearance: He is well-developed.   HENT:      Head: Normocephalic and atraumatic.   Neck:      Vascular: No carotid bruit.   Cardiovascular:      Rate and Rhythm: Normal rate and regular rhythm.      Heart sounds: Normal heart sounds, S1 normal and S2 normal.   Pulmonary:      Effort: Pulmonary effort is normal.      Breath sounds: Normal breath sounds.   Skin:     General: Skin is warm.   Neurological:      Mental Status: He is alert.   Psychiatric:         Behavior: Behavior normal.         Procedures    Assessment/Plan   Diagnoses and all orders for this visit:    1. Prediabetes (Primary)    2. Essential hypertension  -     Discontinue: hydroCHLOROthiazide (HYDRODIURIL) 12.5 MG tablet; Take 1 tablet by mouth Daily.  Dispense: 30 tablet; Refill: 2    3. Alcohol use               He knows he is due for Shingrix. He will get his labs including CMP and A1c. Add HCTZ and discussed side effects; he says he will " stop etoh first instead. Positive on 2 CAGE questions. He says he will stop today. Discussed if he cannot decrease then he should seek assistance.  Return in about 6 weeks (around 10/22/2021) for Medicare Wellness, Lab Before FUP.

## 2021-10-08 ENCOUNTER — PATIENT OUTREACH (OUTPATIENT)
Dept: PHARMACY | Facility: HOSPITAL | Age: 66
End: 2021-10-08

## 2021-10-25 DIAGNOSIS — I10 ESSENTIAL HYPERTENSION: ICD-10-CM

## 2021-10-26 RX ORDER — AMLODIPINE BESYLATE 10 MG/1
10 TABLET ORAL DAILY
Qty: 30 TABLET | Refills: 1 | Status: SHIPPED | OUTPATIENT
Start: 2021-10-26 | End: 2022-01-04

## 2021-11-11 DIAGNOSIS — G47.25 JET LAG: Primary | ICD-10-CM

## 2021-11-11 RX ORDER — ZOLPIDEM TARTRATE 10 MG/1
10 TABLET ORAL NIGHTLY PRN
Qty: 10 TABLET | Refills: 0 | Status: SHIPPED | OUTPATIENT
Start: 2021-11-11 | End: 2022-02-21

## 2021-11-30 DIAGNOSIS — I10 ESSENTIAL HYPERTENSION: ICD-10-CM

## 2021-11-30 DIAGNOSIS — R73.03 PREDIABETES: Primary | ICD-10-CM

## 2021-12-02 ENCOUNTER — LAB (OUTPATIENT)
Dept: LAB | Facility: HOSPITAL | Age: 66
End: 2021-12-02

## 2021-12-02 ENCOUNTER — OFFICE VISIT (OUTPATIENT)
Dept: INTERNAL MEDICINE | Facility: CLINIC | Age: 66
End: 2021-12-02

## 2021-12-02 VITALS
TEMPERATURE: 97.5 F | DIASTOLIC BLOOD PRESSURE: 84 MMHG | WEIGHT: 205 LBS | BODY MASS INDEX: 28.7 KG/M2 | SYSTOLIC BLOOD PRESSURE: 136 MMHG | HEIGHT: 71 IN

## 2021-12-02 DIAGNOSIS — R73.03 PREDIABETES: Chronic | ICD-10-CM

## 2021-12-02 DIAGNOSIS — Z00.00 MEDICARE ANNUAL WELLNESS VISIT, SUBSEQUENT: Primary | ICD-10-CM

## 2021-12-02 DIAGNOSIS — I10 ESSENTIAL HYPERTENSION: Chronic | ICD-10-CM

## 2021-12-02 LAB
ALBUMIN SERPL-MCNC: 4.5 G/DL (ref 3.5–5.2)
ALBUMIN/GLOB SERPL: 1.8 G/DL
ALP SERPL-CCNC: 83 U/L (ref 39–117)
ALT SERPL W P-5'-P-CCNC: 33 U/L (ref 1–41)
ANION GAP SERPL CALCULATED.3IONS-SCNC: 8.1 MMOL/L (ref 5–15)
AST SERPL-CCNC: 20 U/L (ref 1–40)
BILIRUB SERPL-MCNC: 0.5 MG/DL (ref 0–1.2)
BUN SERPL-MCNC: 12 MG/DL (ref 8–23)
BUN/CREAT SERPL: 13 (ref 7–25)
CALCIUM SPEC-SCNC: 9.4 MG/DL (ref 8.6–10.5)
CHLORIDE SERPL-SCNC: 106 MMOL/L (ref 98–107)
CO2 SERPL-SCNC: 28.9 MMOL/L (ref 22–29)
CREAT SERPL-MCNC: 0.92 MG/DL (ref 0.76–1.27)
GFR SERPL CREATININE-BSD FRML MDRD: 82 ML/MIN/1.73
GFR SERPL CREATININE-BSD FRML MDRD: 99 ML/MIN/1.73
GLOBULIN UR ELPH-MCNC: 2.5 GM/DL
GLUCOSE SERPL-MCNC: 119 MG/DL (ref 65–99)
HBA1C MFR BLD: 6.1 % (ref 4.8–5.6)
POTASSIUM SERPL-SCNC: 4.2 MMOL/L (ref 3.5–5.2)
PROT SERPL-MCNC: 7 G/DL (ref 6–8.5)
SODIUM SERPL-SCNC: 143 MMOL/L (ref 136–145)

## 2021-12-02 PROCEDURE — 1159F MED LIST DOCD IN RCRD: CPT | Performed by: INTERNAL MEDICINE

## 2021-12-02 PROCEDURE — 99213 OFFICE O/P EST LOW 20 MIN: CPT | Performed by: INTERNAL MEDICINE

## 2021-12-02 PROCEDURE — 80053 COMPREHEN METABOLIC PANEL: CPT | Performed by: INTERNAL MEDICINE

## 2021-12-02 PROCEDURE — 36415 COLL VENOUS BLD VENIPUNCTURE: CPT | Performed by: INTERNAL MEDICINE

## 2021-12-02 PROCEDURE — G0439 PPPS, SUBSEQ VISIT: HCPCS | Performed by: INTERNAL MEDICINE

## 2021-12-02 PROCEDURE — 83036 HEMOGLOBIN GLYCOSYLATED A1C: CPT | Performed by: INTERNAL MEDICINE

## 2021-12-02 PROCEDURE — 1170F FXNL STATUS ASSESSED: CPT | Performed by: INTERNAL MEDICINE

## 2021-12-02 RX ORDER — VALSARTAN 320 MG/1
320 TABLET ORAL DAILY
Qty: 90 TABLET | Refills: 1 | Status: SHIPPED | OUTPATIENT
Start: 2021-12-02 | End: 2022-03-15

## 2021-12-02 NOTE — PROGRESS NOTES
The ABCs of the Annual Wellness Visit  Subsequent Medicare Wellness Visit    Chief Complaint   Patient presents with   • Medicare Wellness-subsequent      Subjective    History of Present Illness:  Guzman Sanders is a 66 y.o. male who presents for a Subsequent Medicare Wellness Visit.  He has been feeling good. He denies chest pain or dyspnea. He has been checking his BP and it has been 145/85. He  Has decreased his alcohol to 1-2 ounces per day. He exercises 2 days per week.   The following portions of the patient's history were reviewed and   updated as appropriate: allergies, current medications, past family history, past medical history, past social history, past surgical history and problem list.    Compared to one year ago, the patient feels his physical   health is the same.    Compared to one year ago, the patient feels his mental   health is the same.    Recent Hospitalizations:  He was not admitted to the hospital during the last year.       Current Medical Providers:  Patient Care Team:  Tucker Zuniga MD as PCP - General (Internal Medicine)    Outpatient Medications Prior to Visit   Medication Sig Dispense Refill   • amLODIPine (NORVASC) 10 MG tablet Take 1 tablet by mouth Daily. 30 tablet 1   • rosuvastatin (CRESTOR) 5 MG tablet TAKE ONE TABLET BY MOUTH DAILY 90 tablet 0   • sildenafil (Viagra) 100 MG tablet Take 1/2 tablet QD PRN 30 tablet 3   • zolpidem (AMBIEN) 10 MG tablet Take 1 tablet by mouth At Night As Needed for Sleep. 10 tablet 0   • aspirin 81 MG chewable tablet Chew 81 mg Daily.     • valsartan (DIOVAN) 320 MG tablet TAKE 1/2 TABLET BY MOUTH EVERY DAY 30 tablet 3     No facility-administered medications prior to visit.       No opioid medication identified on active medication list. I have reviewed chart for other potential  high risk medication/s and harmful drug interactions in the elderly.          Aspirin is on active medication list. Aspirin use is not indicated based on review of  "current medical condition/s. Risk of harm outweighs potential benefits. Patient instructed to discontinue this medication.  .      Patient Active Problem List   Diagnosis   • Essential hypertension   • Prediabetes   • Other insomnia   • Other hyperlipidemia     Advance Care Planning  Advance Directive is not on file.  ACP discussion was held with the patient during this visit. Patient has an advance directive (not in EMR), copy requested.          Objective    Vitals:    12/02/21 0755   BP: 136/84   Temp: 97.5 °F (36.4 °C)   Weight: 93 kg (205 lb)   Height: 180.3 cm (71\")     BMI Readings from Last 1 Encounters:   12/02/21 28.59 kg/m²   BMI is above normal parameters. Recommendations include: exercise counseling    Does the patient have evidence of cognitive impairment? No    Physical Exam  Vitals reviewed.   Constitutional:       Appearance: He is well-developed.   HENT:      Head: Normocephalic and atraumatic.   Neck:      Thyroid: No thyroid mass or thyromegaly.      Vascular: No carotid bruit.   Cardiovascular:      Rate and Rhythm: Normal rate and regular rhythm.      Heart sounds: Normal heart sounds, S1 normal and S2 normal.   Pulmonary:      Effort: Pulmonary effort is normal.      Breath sounds: Normal breath sounds.   Lymphadenopathy:      Cervical: No cervical adenopathy.   Skin:     General: Skin is warm.   Neurological:      Mental Status: He is alert.   Psychiatric:         Behavior: Behavior normal.       Lab Results   Component Value Date    HGBA1C 6.10 (H) 12/02/2021            HEALTH RISK ASSESSMENT    Smoking Status:  Social History     Tobacco Use   Smoking Status Never Smoker   Smokeless Tobacco Never Used     Alcohol Consumption:  Social History     Substance and Sexual Activity   Alcohol Use Yes    Comment: 1-2 ounces of alcohol per day     Fall Risk Screen:    STEADI Fall Risk Assessment was completed, and patient is at LOW risk for falls.Assessment completed on:12/2/2021    Depression " Screening:  PHQ-2/PHQ-9 Depression Screening 12/2/2021   Little interest or pleasure in doing things 0   Feeling down, depressed, or hopeless 0   Total Score 0       Health Habits and Functional and Cognitive Screening:  Functional & Cognitive Status 12/2/2021   Do you have difficulty preparing food and eating? No   Do you have difficulty bathing yourself, getting dressed or grooming yourself? No   Do you have difficulty using the toilet? No   Do you have difficulty moving around from place to place? No   Do you have trouble with steps or getting out of a bed or a chair? No   Current Diet Well Balanced Diet   Dental Exam Up to date   Eye Exam Up to date   Exercise (times per week) 3 times per week   Current Exercises Include Walking;Tennis   Current Exercise Activities Include -   Do you need help using the phone?  No   Are you deaf or do you have serious difficulty hearing?  No   Do you need help with transportation? No   Do you need help shopping? No   Do you need help preparing meals?  No   Do you need help with housework?  No   Do you need help with laundry? No   Do you need help taking your medications? No   Do you need help managing money? No   Do you ever drive or ride in a car without wearing a seat belt? No   Have you felt unusual stress, anger or loneliness in the last month? No   Who do you live with? Spouse   If you need help, do you have trouble finding someone available to you? No   Have you been bothered in the last four weeks by sexual problems? No   Do you have difficulty concentrating, remembering or making decisions? No       Age-appropriate Screening Schedule:  Refer to the list below for future screening recommendations based on patient's age, sex and/or medical conditions. Orders for these recommended tests are listed in the plan section. The patient has been provided with a written plan.    Health Maintenance   Topic Date Due   • ZOSTER VACCINE (3 of 3) 12/22/2021   • LIPID PANEL  02/17/2022    • TDAP/TD VACCINES (2 - Td or Tdap) 01/01/2023   • INFLUENZA VACCINE  Completed              Assessment/Plan   CMS Preventative Services Quick Reference  Risk Factors Identified During Encounter  Immunizations Discussed/Encouraged (specific Immunizations; Shingrix  The above risks/problems have been discussed with the patient.  Follow up actions/plans if indicated are seen below in the Assessment/Plan Section.  Pertinent information has been shared with the patient in the After Visit Summary.    Diagnoses and all orders for this visit:    1. Medicare annual wellness visit, subsequent (Primary)    2. Prediabetes  Comments:  Labs pending    3. Essential hypertension  Comments:  Increase Diovan to 320 mg daily  Orders:  -     Basic Metabolic Panel; Future  -     valsartan (Diovan) 320 MG tablet; Take 1 tablet by mouth Daily.  Dispense: 90 tablet; Refill: 1        Follow Up:   Return in about 6 weeks (around 1/13/2022), or 30 minutes.     An After Visit Summary and PPPS were made available to the patient.                 CMP and a1c are pending. Increase Diovan. Discussed decreasing alcohol more and increasing exercise to 150 minutes per week.

## 2021-12-13 ENCOUNTER — TELEPHONE (OUTPATIENT)
Dept: INTERNAL MEDICINE | Facility: CLINIC | Age: 66
End: 2021-12-13

## 2021-12-13 DIAGNOSIS — E78.49 OTHER HYPERLIPIDEMIA: ICD-10-CM

## 2021-12-13 RX ORDER — ROSUVASTATIN CALCIUM 5 MG/1
5 TABLET, COATED ORAL DAILY
Qty: 90 TABLET | Refills: 0 | Status: SHIPPED | OUTPATIENT
Start: 2021-12-13 | End: 2022-10-17 | Stop reason: SDUPTHER

## 2021-12-13 NOTE — TELEPHONE ENCOUNTER
Caller: [facility] [address] - [phone] [facility] [phone] FX    Relationship: Pharmacy        Requested Prescriptions:   Requested Prescriptions      No prescriptions requested or ordered in this encounter    ATORVASTATIN 20 MG    Pharmacy where request should be sent: [place] [id number] [place] [id number] - [phone] [facility] [phone] FX     Additional details provided by patient:   Does the patient have less than a 3 day supply:  [x] Yes  [] No    Tahmina Thomson Rep   12/13/21 13:43 EST

## 2021-12-17 ENCOUNTER — TELEPHONE (OUTPATIENT)
Dept: INTERNAL MEDICINE | Facility: CLINIC | Age: 66
End: 2021-12-17

## 2022-01-04 DIAGNOSIS — I10 ESSENTIAL HYPERTENSION: ICD-10-CM

## 2022-01-04 RX ORDER — AMLODIPINE BESYLATE 10 MG/1
10 TABLET ORAL DAILY
Qty: 30 TABLET | Refills: 9 | Status: SHIPPED | OUTPATIENT
Start: 2022-01-04 | End: 2022-03-15

## 2022-02-08 ENCOUNTER — LAB (OUTPATIENT)
Dept: LAB | Facility: HOSPITAL | Age: 67
End: 2022-02-08

## 2022-02-08 PROCEDURE — 80048 BASIC METABOLIC PNL TOTAL CA: CPT | Performed by: INTERNAL MEDICINE

## 2022-02-10 DIAGNOSIS — N52.9 ERECTILE DYSFUNCTION, UNSPECIFIED ERECTILE DYSFUNCTION TYPE: ICD-10-CM

## 2022-02-11 RX ORDER — SILDENAFIL 100 MG/1
TABLET, FILM COATED ORAL
Qty: 30 TABLET | Refills: 3 | Status: SHIPPED | OUTPATIENT
Start: 2022-02-11 | End: 2022-12-24 | Stop reason: SDUPTHER

## 2022-02-21 ENCOUNTER — OFFICE VISIT (OUTPATIENT)
Dept: INTERNAL MEDICINE | Facility: CLINIC | Age: 67
End: 2022-02-21

## 2022-02-21 VITALS
HEART RATE: 63 BPM | OXYGEN SATURATION: 99 % | DIASTOLIC BLOOD PRESSURE: 80 MMHG | SYSTOLIC BLOOD PRESSURE: 150 MMHG | TEMPERATURE: 98 F

## 2022-02-21 DIAGNOSIS — I10 ESSENTIAL HYPERTENSION: Primary | Chronic | ICD-10-CM

## 2022-02-21 DIAGNOSIS — G47.09 OTHER INSOMNIA: ICD-10-CM

## 2022-02-21 DIAGNOSIS — Z12.5 SCREENING FOR PROSTATE CANCER: ICD-10-CM

## 2022-02-21 DIAGNOSIS — E78.49 OTHER HYPERLIPIDEMIA: Chronic | ICD-10-CM

## 2022-02-21 DIAGNOSIS — R73.03 PREDIABETES: ICD-10-CM

## 2022-02-21 DIAGNOSIS — R09.89 THROAT CLEARING: ICD-10-CM

## 2022-02-21 PROCEDURE — 99214 OFFICE O/P EST MOD 30 MIN: CPT | Performed by: INTERNAL MEDICINE

## 2022-02-21 RX ORDER — ZOLPIDEM TARTRATE 5 MG/1
5 TABLET ORAL NIGHTLY PRN
Qty: 30 TABLET | Refills: 1 | Status: SHIPPED | OUTPATIENT
Start: 2022-02-21 | End: 2022-04-04 | Stop reason: SDUPTHER

## 2022-02-21 RX ORDER — ESOMEPRAZOLE MAGNESIUM 40 MG/1
40 CAPSULE, DELAYED RELEASE ORAL DAILY
Qty: 30 CAPSULE | Refills: 1 | Status: SHIPPED | OUTPATIENT
Start: 2022-02-21 | End: 2022-08-01 | Stop reason: SDUPTHER

## 2022-02-21 RX ORDER — METOPROLOL SUCCINATE 50 MG/1
50 TABLET, EXTENDED RELEASE ORAL DAILY
Qty: 30 TABLET | Refills: 1 | Status: SHIPPED | OUTPATIENT
Start: 2022-02-21 | End: 2022-04-04

## 2022-02-21 NOTE — PROGRESS NOTES
Subjective        Chief Complaint   Patient presents with   • Hypertension           Guzman Sanders is a 66 y.o. male who presents for    Patient Active Problem List   Diagnosis   • Essential hypertension   • Prediabetes   • Other insomnia   • Other hyperlipidemia       History of Present Illness     He had two negative tests for COVID last week. He had a slight headache with scratchy throat one week ago. One was at the [facility] and one was at [facility]. He felt better on Friday. He has played tennis without any issues. He took some Zmax last week. His BP has been 135-140/80. He has lost 8 pounds with using an tae that keeps track of what he eats. He has decreased his alcohol to one ounce per day. He has not been sleeping as well since he got back from [facility]. He has noticed that when he takes Zolpidem 5 mg hs then he sleeps better. He has throat clearing after eating for years. He only has reflux if he eats late.  No Known Allergies    Current Outpatient Medications on File Prior to Visit   Medication Sig Dispense Refill   • amLODIPine (NORVASC) 10 MG tablet Take 1 tablet by mouth Daily. 30 tablet 9   • rosuvastatin (CRESTOR) 5 MG tablet Take 1 tablet by mouth Daily. 90 tablet 0   • sildenafil (Viagra) 100 MG tablet Take 1/2 tablet QD PRN 30 tablet 3   • valsartan (Diovan) 320 MG tablet Take 1 tablet by mouth Daily. 90 tablet 1   • [DISCONTINUED] zolpidem (AMBIEN) 10 MG tablet Take 1 tablet by mouth At Night As Needed for Sleep. 10 tablet 0     No current facility-administered medications on file prior to visit.       Past Medical History:   Diagnosis Date   • Allergic rhinitis    • Erectile dysfunction    • Inability to attain erection        Past Surgical History:   Procedure Laterality Date   • COLONOSCOPY  12/17/2013    repeat 10 years Dr. Valdivia   • HEMORRHOIDECTOMY     • UMBILICAL HERNIA REPAIR         Family History   Problem Relation Age of Onset   • Other Mother         cerebral aneurysm   • Cancer  Father         throat       Social History     Socioeconomic History   • Marital status:    Tobacco Use   • Smoking status: Never Smoker   • Smokeless tobacco: Never Used   Substance and Sexual Activity   • Alcohol use: Yes     Comment: 1-2 ounces of alcohol per day   • Drug use: Never   • Sexual activity: Never           The following portions of the patient's history were reviewed and updated as appropriate: problem list, allergies, current medications, past medical history, past family history, past social history and past surgical history.    Review of Systems    Immunization History   Administered Date(s) Administered   • COVID-19 (PFIZER) PURPLE CAP 01/28/2021, 02/17/2021, 10/05/2021   • DTaP 01/01/2013   • Flu Vaccine Split Quad 10/15/2018   • Fluad Quad 65+ 10/27/2021   • Fluzone High Dose =>65 Years (Vaxcare ONLY) 01/03/2020, 08/23/2020   • Pneumococcal Conjugate 13-Valent (PCV13) 01/13/2020   • Pneumococcal Polysaccharide (PPSV23) 03/04/2021   • Shingrix 10/27/2021   • Tdap 01/01/2013   • Zostavax 03/16/2015       Objective   Vitals:    02/21/22 0815   BP: 150/80   Pulse: 63   Temp: 98 °F (36.7 °C)   SpO2: 99%     There is no height or weight on file to calculate BMI.  Physical Exam  Vitals reviewed.   Constitutional:       Appearance: He is well-developed.   HENT:      Head: Normocephalic and atraumatic.      Mouth/Throat:      Mouth: Mucous membranes are moist.      Pharynx: Oropharynx is clear.   Cardiovascular:      Rate and Rhythm: Normal rate and regular rhythm.      Heart sounds: Normal heart sounds, S1 normal and S2 normal.   Pulmonary:      Effort: Pulmonary effort is normal.      Breath sounds: Normal breath sounds.   Skin:     General: Skin is warm.   Neurological:      Mental Status: He is alert.   Psychiatric:         Behavior: Behavior normal.         Procedures    Assessment/Plan   Diagnoses and all orders for this visit:    1. Essential hypertension (Primary)  -     metoprolol  succinate XL (Toprol XL) 50 MG 24 hr tablet; Take 1 tablet by mouth Daily.  Dispense: 30 tablet; Refill: 1    2. Other hyperlipidemia  -     Comprehensive Metabolic Panel; Future  -     Lipid Panel With / Chol / HDL Ratio; Future    3. Other insomnia  -     zolpidem (AMBIEN) 5 MG tablet; Take 1 tablet by mouth At Night As Needed for Sleep.  Dispense: 30 tablet; Refill: 1    4. Throat clearing  -     esomeprazole (nexIUM) 40 MG capsule; Take 1 capsule by mouth Daily.  Dispense: 30 capsule; Refill: 1    5. Prediabetes  -     Hemoglobin A1c; Future    6. Screening for prostate cancer  -     PSA Screen; Future             Add Toprol. Bring in BP monitor next time. He has decreased his alcohol. Daljit SHAFER. Instructed no etoh when uses Ambien. I will have him sign controlled substance agreement next time. Trial Nexium for throat clearing. Reviewed bmp.    Return in about 6 weeks (around 4/4/2022), or 30 minute, for Lab Before FUP.

## 2022-03-15 DIAGNOSIS — I10 ESSENTIAL HYPERTENSION: ICD-10-CM

## 2022-03-15 DIAGNOSIS — I10 ESSENTIAL HYPERTENSION: Primary | ICD-10-CM

## 2022-03-15 RX ORDER — VALSARTAN 160 MG/1
160 TABLET ORAL 2 TIMES DAILY
Qty: 60 TABLET | Refills: 1 | OUTPATIENT
Start: 2022-03-15

## 2022-03-15 RX ORDER — AMLODIPINE BESYLATE 5 MG/1
TABLET ORAL
Qty: 60 TABLET | Refills: 1 | Status: SHIPPED | OUTPATIENT
Start: 2022-03-15 | End: 2022-10-17 | Stop reason: SDUPTHER

## 2022-03-15 RX ORDER — VALSARTAN 160 MG/1
160 TABLET ORAL 2 TIMES DAILY
Qty: 60 TABLET | Refills: 1 | Status: SHIPPED | OUTPATIENT
Start: 2022-03-15 | End: 2022-04-13 | Stop reason: SDUPTHER

## 2022-03-17 DIAGNOSIS — I10 ESSENTIAL HYPERTENSION: ICD-10-CM

## 2022-03-17 DIAGNOSIS — I10 ESSENTIAL HYPERTENSION: Primary | ICD-10-CM

## 2022-03-17 RX ORDER — HYDROCHLOROTHIAZIDE 12.5 MG/1
12.5 TABLET ORAL DAILY
Qty: 30 TABLET | Refills: 1 | Status: SHIPPED | OUTPATIENT
Start: 2022-03-17 | End: 2022-03-17

## 2022-03-17 RX ORDER — HYDROCHLOROTHIAZIDE 12.5 MG/1
12.5 TABLET ORAL DAILY
Qty: 30 TABLET | Refills: 4 | Status: SHIPPED | OUTPATIENT
Start: 2022-03-17 | End: 2022-08-01 | Stop reason: SDUPTHER

## 2022-03-30 ENCOUNTER — LAB (OUTPATIENT)
Dept: LAB | Facility: HOSPITAL | Age: 67
End: 2022-03-30

## 2022-03-30 PROCEDURE — G0103 PSA SCREENING: HCPCS | Performed by: INTERNAL MEDICINE

## 2022-03-30 PROCEDURE — 83036 HEMOGLOBIN GLYCOSYLATED A1C: CPT | Performed by: INTERNAL MEDICINE

## 2022-03-30 PROCEDURE — 80053 COMPREHEN METABOLIC PANEL: CPT | Performed by: INTERNAL MEDICINE

## 2022-03-30 PROCEDURE — 80061 LIPID PANEL: CPT | Performed by: INTERNAL MEDICINE

## 2022-04-04 ENCOUNTER — OFFICE VISIT (OUTPATIENT)
Dept: INTERNAL MEDICINE | Facility: CLINIC | Age: 67
End: 2022-04-04

## 2022-04-04 VITALS
TEMPERATURE: 97.8 F | HEIGHT: 71 IN | WEIGHT: 188 LBS | SYSTOLIC BLOOD PRESSURE: 128 MMHG | DIASTOLIC BLOOD PRESSURE: 80 MMHG | BODY MASS INDEX: 26.32 KG/M2

## 2022-04-04 DIAGNOSIS — G47.09 OTHER INSOMNIA: ICD-10-CM

## 2022-04-04 DIAGNOSIS — E78.49 OTHER HYPERLIPIDEMIA: Chronic | ICD-10-CM

## 2022-04-04 DIAGNOSIS — I10 ESSENTIAL HYPERTENSION: Chronic | ICD-10-CM

## 2022-04-04 DIAGNOSIS — R73.03 PREDIABETES: Primary | Chronic | ICD-10-CM

## 2022-04-04 PROCEDURE — 99214 OFFICE O/P EST MOD 30 MIN: CPT | Performed by: INTERNAL MEDICINE

## 2022-04-04 RX ORDER — ZOLPIDEM TARTRATE 5 MG/1
5 TABLET ORAL NIGHTLY PRN
Qty: 30 TABLET | Refills: 3 | Status: SHIPPED | OUTPATIENT
Start: 2022-04-04 | End: 2022-06-10 | Stop reason: SDUPTHER

## 2022-04-04 NOTE — PROGRESS NOTES
Subjective        Chief Complaint   Patient presents with   • Hypertension           Guzman Sanders is a 66 y.o. male who presents for    Patient Active Problem List   Diagnosis   • Essential hypertension   • Prediabetes   • Other insomnia   • Other hyperlipidemia       History of Present Illness     He has lost weight with [place] tae. He is exercising almost daily. He denies chest pain. His BP was 128/78 and 121/80. His highest SBP was 138. He uses Ambien for sleep. He is rested when he takes it. He plays tennis twice per week and he golfs and uses the treadmill. His throat clearing is a lot better with Nexium.  No Known Allergies    Current Outpatient Medications on File Prior to Visit   Medication Sig Dispense Refill   • amLODIPine (NORVASC) 5 MG tablet One tab po BID 60 tablet 1   • esomeprazole (nexIUM) 40 MG capsule Take 1 capsule by mouth Daily. 30 capsule 1   • hydroCHLOROthiazide (HYDRODIURIL) 12.5 MG tablet Take 1 tablet by mouth Daily. 30 tablet 4   • rosuvastatin (CRESTOR) 5 MG tablet Take 1 tablet by mouth Daily. 90 tablet 0   • sildenafil (Viagra) 100 MG tablet Take 1/2 tablet QD PRN 30 tablet 3   • valsartan (Diovan) 160 MG tablet Take 1 tablet by mouth 2 (Two) Times a Day. 60 tablet 1   • [DISCONTINUED] zolpidem (AMBIEN) 5 MG tablet Take 1 tablet by mouth At Night As Needed for Sleep. 30 tablet 1   • [DISCONTINUED] metoprolol succinate XL (Toprol XL) 50 MG 24 hr tablet Take 1 tablet by mouth Daily. 30 tablet 1     No current facility-administered medications on file prior to visit.       Past Medical History:   Diagnosis Date   • Allergic rhinitis    • Erectile dysfunction    • Inability to attain erection        Past Surgical History:   Procedure Laterality Date   • COLONOSCOPY  12/17/2013    repeat 10 years Dr. Valdivia   • HEMORRHOIDECTOMY     • UMBILICAL HERNIA REPAIR         Family History   Problem Relation Age of Onset   • Other Mother         cerebral aneurysm   • Cancer Father         throat  "      Social History     Socioeconomic History   • Marital status:    Tobacco Use   • Smoking status: Never Smoker   • Smokeless tobacco: Never Used   Substance and Sexual Activity   • Alcohol use: Yes     Comment: 1-2 ounces of alcohol per day   • Drug use: Never   • Sexual activity: Never           The following portions of the patient's history were reviewed and updated as appropriate: problem list, allergies, current medications, past medical history, past family history, past social history and past surgical history.    Review of Systems    Immunization History   Administered Date(s) Administered   • COVID-19 (PFIZER) PURPLE CAP 01/28/2021, 02/17/2021, 10/05/2021   • DTaP 01/01/2013   • Flu Vaccine Split Quad 10/15/2018   • Fluad Quad 65+ 10/27/2021   • Fluzone High Dose =>65 Years (Vaxcare ONLY) 01/03/2020, 08/23/2020   • Pneumococcal Conjugate 13-Valent (PCV13) 01/13/2020   • Pneumococcal Polysaccharide (PPSV23) 03/04/2021   • Shingrix 10/27/2021   • Tdap 01/01/2013   • Zostavax 03/16/2015       Objective   Vitals:    04/04/22 0847   BP: 128/80   Temp: 97.8 °F (36.6 °C)   Weight: 85.3 kg (188 lb)   Height: 180.3 cm (70.98\")     Body mass index is 26.23 kg/m².  Physical Exam  Vitals reviewed.   Constitutional:       Appearance: He is well-developed.   HENT:      Head: Normocephalic and atraumatic.   Cardiovascular:      Rate and Rhythm: Normal rate and regular rhythm.      Heart sounds: Normal heart sounds, S1 normal and S2 normal.   Pulmonary:      Effort: Pulmonary effort is normal.      Breath sounds: Normal breath sounds.   Skin:     General: Skin is warm.   Neurological:      Mental Status: He is alert.   Psychiatric:         Behavior: Behavior normal.         Procedures    Assessment/Plan   Diagnoses and all orders for this visit:    1. Prediabetes (Primary)  -     Hemoglobin A1c; Future    2. Other hyperlipidemia    3. Essential hypertension  -     Basic Metabolic Panel; Future    4. Other " insomnia  -     zolpidem (AMBIEN) 5 MG tablet; Take 1 tablet by mouth At Night As Needed for Sleep.  Dispense: 30 tablet; Refill: 3               Signed controlled substance agreement for Ambien. HOLLIS higgins. Reviewed cmp, flp, psa, and a1c. He is losing wt on purpose. LDL is at goal. BP is improving. He is doing a great job losing weight.  Return in about 4 months (around 8/4/2022) for Lab Before FUP.

## 2022-04-13 DIAGNOSIS — I10 ESSENTIAL HYPERTENSION: ICD-10-CM

## 2022-04-13 RX ORDER — VALSARTAN 160 MG/1
160 TABLET ORAL 2 TIMES DAILY
Qty: 60 TABLET | Refills: 1 | Status: SHIPPED | OUTPATIENT
Start: 2022-04-13 | End: 2022-06-24 | Stop reason: SDUPTHER

## 2022-06-10 DIAGNOSIS — G47.09 OTHER INSOMNIA: ICD-10-CM

## 2022-06-10 RX ORDER — ZOLPIDEM TARTRATE 5 MG/1
5 TABLET ORAL NIGHTLY PRN
Qty: 30 TABLET | Refills: 3 | Status: SHIPPED | OUTPATIENT
Start: 2022-06-10 | End: 2022-08-15 | Stop reason: SDUPTHER

## 2022-06-24 DIAGNOSIS — I10 ESSENTIAL HYPERTENSION: ICD-10-CM

## 2022-06-24 RX ORDER — VALSARTAN 160 MG/1
160 TABLET ORAL 2 TIMES DAILY
Qty: 180 TABLET | Refills: 1 | Status: SHIPPED | OUTPATIENT
Start: 2022-06-24 | End: 2022-10-17 | Stop reason: SDUPTHER

## 2022-07-26 NOTE — PROGRESS NOTES
Subjective        Chief Complaint   Patient presents with   • Hypertension           Guzman Sanders is a 65 y.o. male who presents for    Patient Active Problem List   Diagnosis   • Essential hypertension   • Prediabetes   • Other insomnia   • Other hyperlipidemia       History of Present Illness     His BP has been 130/70-80. He exercises 4-5 times per week. He plays tennis and golf. He denies chest pain or dyspnea. He does not take Lipitor. He took it for a week. He developed pain in his legs. He can fall asleep okay but wakes up around 2 am and he cannot fall back asleep. He has tried Ambien and he feels groggy the next day. He has two bourbons nightly since the lockdown.   No Known Allergies    Current Outpatient Medications on File Prior to Visit   Medication Sig Dispense Refill   • amLODIPine (NORVASC) 10 MG tablet Take 1 tablet by mouth Daily. 30 tablet 3   • sildenafil (Viagra) 100 MG tablet Take 1/2 tablet QD PRN 20 tablet 3   • valsartan (DIOVAN) 320 MG tablet TAKE 1/2 TABLET BY MOUTH EVERY DAY 30 tablet 3   • [DISCONTINUED] atorvastatin (LIPITOR) 20 MG tablet Take 1 tablet by mouth Daily. 30 tablet 3   • [DISCONTINUED] zolpidem (AMBIEN) 10 MG tablet Take 1 tablet by mouth At Night As Needed for Sleep. 15 tablet 0   • aspirin 81 MG chewable tablet Chew 81 mg Daily.     • [DISCONTINUED] valsartan (DIOVAN) 160 MG tablet Take 1 tablet by mouth Daily. 30 tablet 3     No current facility-administered medications on file prior to visit.        Past Medical History:   Diagnosis Date   • Allergic rhinitis    • Erectile dysfunction    • Inability to attain erection    • Increased prostate specific antigen (PSA) velocity        Past Surgical History:   Procedure Laterality Date   • COLONOSCOPY  12/17/2013   • HEMORRHOIDECTOMY     • UMBILICAL HERNIA REPAIR         Family History   Problem Relation Age of Onset   • Other Mother         cerebral aneurysm   • Cancer Father         throat       Social History  "    Socioeconomic History   • Marital status:      Spouse name: Not on file   • Number of children: Not on file   • Years of education: Not on file   • Highest education level: Not on file   Tobacco Use   • Smoking status: Never Smoker   • Smokeless tobacco: Never Used   Substance and Sexual Activity   • Alcohol use: Yes     Frequency: 4 or more times a week     Drinks per session: 1 or 2     Comment: one drink per day   • Drug use: Never   • Sexual activity: Never           The following portions of the patient's history were reviewed and updated as appropriate: problem list, allergies, current medications, past medical history, past family history, past social history and past surgical history.    Review of Systems   Respiratory: Negative for shortness of breath.    Cardiovascular: Negative for chest pain.       Immunization History   Administered Date(s) Administered   • DTaP 01/01/2013   • Flu Vaccine Split Quad 10/15/2018   • Fluzone High Dose =>65 Years (Vaxcare ONLY) 01/03/2020, 08/23/2020   • Pneumococcal Conjugate 13-Valent (PCV13) 01/13/2020   • Tdap 01/01/2013   • Zostavax 03/16/2015       Objective   Vitals:    11/20/20 0855   BP: 126/78   Temp: 97 °F (36.1 °C)   Weight: 84.4 kg (186 lb)   Height: 180.3 cm (71\")     Body mass index is 25.94 kg/m².  Physical Exam  Vitals signs reviewed.   Constitutional:       Appearance: He is well-developed.   HENT:      Head: Normocephalic and atraumatic.   Cardiovascular:      Rate and Rhythm: Normal rate and regular rhythm.      Heart sounds: Normal heart sounds, S1 normal and S2 normal.   Pulmonary:      Effort: Pulmonary effort is normal.      Breath sounds: Normal breath sounds.   Skin:     General: Skin is warm.   Neurological:      Mental Status: He is alert.   Psychiatric:         Behavior: Behavior normal.         Procedures    Assessment/Plan   Diagnoses and all orders for this visit:    1. Essential hypertension (Primary)  Comments:  BP is " excellent  Orders:  -     Cancel: Comprehensive Metabolic Panel; Future  -     Comprehensive Metabolic Panel; Future    2. Other hyperlipidemia  Comments:  Try small dose Crestor with co q 10.  Orders:  -     rosuvastatin (Crestor) 5 MG tablet; Take 1 tablet by mouth Daily.  Dispense: 30 tablet; Refill: 3  -     Cancel: Lipid Panel With / Chol / HDL Ratio; Future  -     Lipid Panel With / Chol / HDL Ratio; Future    3. Prediabetes  Comments:  a1c is stable  Orders:  -     Cancel: Hemoglobin A1c; Future  -     Hemoglobin A1c; Future    4. Other insomnia  Comments:  Discuss decreasing bourbon as it may be affecting his sleep.    5. Screening for prostate cancer  -     PSA Screen; Future               Reviewed labs. We will try Crestor at smallest dose with 200 mg Co Q10. He is exercising regularly. Pneumovax next time. Stop ambien. BP is good.  Return in about 3 months (around 2/20/2021), or 30 minutes.   Neto

## 2022-08-01 DIAGNOSIS — R73.03 PREDIABETES: Chronic | ICD-10-CM

## 2022-08-01 DIAGNOSIS — I10 ESSENTIAL HYPERTENSION: Chronic | ICD-10-CM

## 2022-08-01 DIAGNOSIS — R09.89 THROAT CLEARING: ICD-10-CM

## 2022-08-01 DIAGNOSIS — I10 ESSENTIAL HYPERTENSION: ICD-10-CM

## 2022-08-01 RX ORDER — ESOMEPRAZOLE MAGNESIUM 40 MG/1
40 CAPSULE, DELAYED RELEASE ORAL DAILY
Qty: 30 CAPSULE | Refills: 1 | Status: SHIPPED | OUTPATIENT
Start: 2022-08-01 | End: 2022-10-17 | Stop reason: SDUPTHER

## 2022-08-01 RX ORDER — HYDROCHLOROTHIAZIDE 12.5 MG/1
12.5 TABLET ORAL DAILY
Qty: 30 TABLET | Refills: 4 | Status: SHIPPED | OUTPATIENT
Start: 2022-08-01 | End: 2022-10-17 | Stop reason: SDUPTHER

## 2022-08-02 ENCOUNTER — LAB (OUTPATIENT)
Dept: LAB | Facility: HOSPITAL | Age: 67
End: 2022-08-02

## 2022-08-02 LAB
ANION GAP SERPL CALCULATED.3IONS-SCNC: 11.6 MMOL/L (ref 5–15)
BUN SERPL-MCNC: 11 MG/DL (ref 8–23)
BUN/CREAT SERPL: 11.7 (ref 7–25)
CALCIUM SPEC-SCNC: 9 MG/DL (ref 8.6–10.5)
CHLORIDE SERPL-SCNC: 103 MMOL/L (ref 98–107)
CO2 SERPL-SCNC: 24.4 MMOL/L (ref 22–29)
CREAT SERPL-MCNC: 0.94 MG/DL (ref 0.76–1.27)
EGFRCR SERPLBLD CKD-EPI 2021: 88.9 ML/MIN/1.73
GLUCOSE SERPL-MCNC: 122 MG/DL (ref 65–99)
HBA1C MFR BLD: 6.1 % (ref 4.8–5.6)
POTASSIUM SERPL-SCNC: 4.5 MMOL/L (ref 3.5–5.2)
SODIUM SERPL-SCNC: 139 MMOL/L (ref 136–145)

## 2022-08-02 PROCEDURE — 80048 BASIC METABOLIC PNL TOTAL CA: CPT | Performed by: INTERNAL MEDICINE

## 2022-08-02 PROCEDURE — 83036 HEMOGLOBIN GLYCOSYLATED A1C: CPT | Performed by: INTERNAL MEDICINE

## 2022-08-02 PROCEDURE — 36415 COLL VENOUS BLD VENIPUNCTURE: CPT

## 2022-08-05 ENCOUNTER — OFFICE VISIT (OUTPATIENT)
Dept: INTERNAL MEDICINE | Facility: CLINIC | Age: 67
End: 2022-08-05

## 2022-08-05 VITALS
HEIGHT: 71 IN | TEMPERATURE: 97.8 F | BODY MASS INDEX: 26.32 KG/M2 | DIASTOLIC BLOOD PRESSURE: 80 MMHG | SYSTOLIC BLOOD PRESSURE: 140 MMHG | HEART RATE: 66 BPM | WEIGHT: 188 LBS

## 2022-08-05 DIAGNOSIS — I10 ESSENTIAL HYPERTENSION: ICD-10-CM

## 2022-08-05 DIAGNOSIS — R73.03 PREDIABETES: Primary | Chronic | ICD-10-CM

## 2022-08-05 PROCEDURE — 99214 OFFICE O/P EST MOD 30 MIN: CPT | Performed by: INTERNAL MEDICINE

## 2022-08-05 NOTE — PROGRESS NOTES
Subjective        Chief Complaint   Patient presents with   • Hypertension           Guzman Sanders is a 66 y.o. male who presents for    Patient Active Problem List   Diagnosis   • Essential hypertension   • Prediabetes   • Other insomnia   • Other hyperlipidemia       History of Present Illness     His BP has been 130-135/80-85. He denies chest pain or dyspnea. He plays tennis twice per and golf twice per week. He had COVID in June and he took Paxlovid without any issues. He ran out of his HCTZ three weeks ago b/c he lost it with travelling. He only takes Crestor a few times per week.  No Known Allergies    Current Outpatient Medications on File Prior to Visit   Medication Sig Dispense Refill   • amLODIPine (NORVASC) 5 MG tablet One tab po BID 60 tablet 1   • esomeprazole (nexIUM) 40 MG capsule Take 1 capsule by mouth Daily. 30 capsule 1   • hydroCHLOROthiazide (HYDRODIURIL) 12.5 MG tablet Take 1 tablet by mouth Daily. 30 tablet 4   • rosuvastatin (CRESTOR) 5 MG tablet Take 1 tablet by mouth Daily. 90 tablet 0   • sildenafil (Viagra) 100 MG tablet Take 1/2 tablet QD PRN 30 tablet 3   • valsartan (DIOVAN) 160 MG tablet Take 1 tablet by mouth 2 (Two) Times a Day. 180 tablet 1   • zolpidem (AMBIEN) 5 MG tablet Take 1 tablet by mouth At Night As Needed for Sleep. 30 tablet 3     No current facility-administered medications on file prior to visit.       Past Medical History:   Diagnosis Date   • Allergic rhinitis    • COVID-19 06/2022   • Erectile dysfunction    • Inability to attain erection        Past Surgical History:   Procedure Laterality Date   • COLONOSCOPY  12/17/2013    repeat 10 years Dr. Valdivia   • HEMORRHOIDECTOMY     • UMBILICAL HERNIA REPAIR         Family History   Problem Relation Age of Onset   • Other Mother         cerebral aneurysm   • Cancer Father         throat       Social History     Socioeconomic History   • Marital status:    Tobacco Use   • Smoking status: Never Smoker   • Smokeless  "tobacco: Never Used   Substance and Sexual Activity   • Alcohol use: Yes     Comment: 1-2 ounces of alcohol per day   • Drug use: Never   • Sexual activity: Never           The following portions of the patient's history were reviewed and updated as appropriate: problem list, allergies, current medications, past medical history, past family history, past social history and past surgical history.    Review of Systems    Immunization History   Administered Date(s) Administered   • COVID-19 (PFIZER) PURPLE CAP 01/28/2021, 02/17/2021, 10/05/2021   • Covid-19 (Pfizer) Gray Cap 04/09/2022   • DTaP 01/01/2013   • Flu Vaccine Split Quad 10/15/2018   • Fluad Quad 65+ 10/27/2021   • Fluzone High Dose =>65 Years (Vaxcare ONLY) 01/03/2020, 08/23/2020   • Pneumococcal Conjugate 13-Valent (PCV13) 01/13/2020   • Pneumococcal Polysaccharide (PPSV23) 03/04/2021   • Shingrix 10/27/2021   • Tdap 01/01/2013   • Zostavax 03/16/2015       Objective   Vitals:    08/05/22 1123   BP: 140/80   Pulse: 66   Temp: 97.8 °F (36.6 °C)   Weight: 85.3 kg (188 lb)   Height: 180.2 cm (70.95\")     Body mass index is 26.26 kg/m².  Physical Exam  Vitals reviewed.   Constitutional:       Appearance: He is well-developed.   HENT:      Head: Normocephalic and atraumatic.   Cardiovascular:      Rate and Rhythm: Normal rate and regular rhythm.      Heart sounds: Normal heart sounds, S1 normal and S2 normal.   Pulmonary:      Effort: Pulmonary effort is normal.      Breath sounds: Normal breath sounds.   Skin:     General: Skin is warm.   Neurological:      Mental Status: He is alert.   Psychiatric:         Behavior: Behavior normal.         Procedures    Assessment & Plan   Diagnoses and all orders for this visit:    1. Prediabetes (Primary)  -     Hemoglobin A1c; Future    2. Essential hypertension  Comments:  He ran out of HCTZ about 3 weeks ago. He is going to  today.  Orders:  -     Comprehensive Metabolic Panel; Future             Reviewed bmp and " a1c. BP is up today since out of HCTZ for 3 weeks. Discussed compliance with Crestor as misses several doses per week.    Return in about 4 months (around 12/5/2022) for Lab Before [facility], Medicare Wellness.

## 2022-08-15 DIAGNOSIS — G47.09 OTHER INSOMNIA: ICD-10-CM

## 2022-08-16 RX ORDER — ZOLPIDEM TARTRATE 5 MG/1
5 TABLET ORAL NIGHTLY PRN
Qty: 30 TABLET | Refills: 3 | Status: SHIPPED | OUTPATIENT
Start: 2022-08-16 | End: 2022-09-17 | Stop reason: SDUPTHER

## 2022-09-17 DIAGNOSIS — G47.09 OTHER INSOMNIA: ICD-10-CM

## 2022-09-19 RX ORDER — ZOLPIDEM TARTRATE 5 MG/1
5 TABLET ORAL NIGHTLY PRN
Qty: 30 TABLET | Refills: 3 | Status: SHIPPED | OUTPATIENT
Start: 2022-09-19 | End: 2022-10-17 | Stop reason: SDUPTHER

## 2022-10-17 DIAGNOSIS — G47.09 OTHER INSOMNIA: ICD-10-CM

## 2022-10-17 DIAGNOSIS — I10 ESSENTIAL HYPERTENSION: ICD-10-CM

## 2022-10-17 DIAGNOSIS — R09.89 THROAT CLEARING: ICD-10-CM

## 2022-10-17 DIAGNOSIS — E78.49 OTHER HYPERLIPIDEMIA: ICD-10-CM

## 2022-10-18 RX ORDER — ROSUVASTATIN CALCIUM 5 MG/1
5 TABLET, COATED ORAL DAILY
Qty: 90 TABLET | Refills: 0 | Status: SHIPPED | OUTPATIENT
Start: 2022-10-18 | End: 2023-01-31

## 2022-10-18 RX ORDER — ESOMEPRAZOLE MAGNESIUM 40 MG/1
40 CAPSULE, DELAYED RELEASE ORAL DAILY
Qty: 30 CAPSULE | Refills: 1 | Status: SHIPPED | OUTPATIENT
Start: 2022-10-18 | End: 2022-12-19

## 2022-10-18 RX ORDER — ZOLPIDEM TARTRATE 5 MG/1
5 TABLET ORAL NIGHTLY PRN
Qty: 30 TABLET | Refills: 3 | Status: SHIPPED | OUTPATIENT
Start: 2022-10-18 | End: 2022-11-21 | Stop reason: SDUPTHER

## 2022-10-18 RX ORDER — HYDROCHLOROTHIAZIDE 12.5 MG/1
12.5 TABLET ORAL DAILY
Qty: 30 TABLET | Refills: 4 | Status: SHIPPED | OUTPATIENT
Start: 2022-10-18

## 2022-10-18 RX ORDER — VALSARTAN 160 MG/1
160 TABLET ORAL 2 TIMES DAILY
Qty: 180 TABLET | Refills: 1 | Status: SHIPPED | OUTPATIENT
Start: 2022-10-18 | End: 2023-02-24 | Stop reason: SDUPTHER

## 2022-10-18 RX ORDER — AMLODIPINE BESYLATE 5 MG/1
TABLET ORAL
Qty: 60 TABLET | Refills: 1 | Status: SHIPPED | OUTPATIENT
Start: 2022-10-18 | End: 2022-12-19

## 2022-11-21 DIAGNOSIS — G47.09 OTHER INSOMNIA: ICD-10-CM

## 2022-11-22 RX ORDER — ZOLPIDEM TARTRATE 5 MG/1
5 TABLET ORAL NIGHTLY PRN
Qty: 30 TABLET | Refills: 3 | Status: SHIPPED | OUTPATIENT
Start: 2022-11-22 | End: 2022-12-24 | Stop reason: SDUPTHER

## 2022-12-19 DIAGNOSIS — R09.89 THROAT CLEARING: ICD-10-CM

## 2022-12-19 DIAGNOSIS — I10 ESSENTIAL HYPERTENSION: ICD-10-CM

## 2022-12-19 RX ORDER — ESOMEPRAZOLE MAGNESIUM 40 MG/1
40 CAPSULE, DELAYED RELEASE ORAL DAILY
Qty: 30 CAPSULE | Refills: 1 | Status: SHIPPED | OUTPATIENT
Start: 2022-12-19

## 2022-12-19 RX ORDER — AMLODIPINE BESYLATE 5 MG/1
TABLET ORAL
Qty: 60 TABLET | Refills: 1 | Status: SHIPPED | OUTPATIENT
Start: 2022-12-19 | End: 2023-02-24 | Stop reason: SDUPTHER

## 2022-12-24 DIAGNOSIS — N52.9 ERECTILE DYSFUNCTION, UNSPECIFIED ERECTILE DYSFUNCTION TYPE: ICD-10-CM

## 2022-12-24 DIAGNOSIS — G47.09 OTHER INSOMNIA: ICD-10-CM

## 2022-12-27 RX ORDER — SILDENAFIL 100 MG/1
TABLET, FILM COATED ORAL
Qty: 30 TABLET | Refills: 3 | Status: SHIPPED | OUTPATIENT
Start: 2022-12-27

## 2022-12-27 RX ORDER — ZOLPIDEM TARTRATE 5 MG/1
5 TABLET ORAL NIGHTLY PRN
Qty: 30 TABLET | Refills: 3 | Status: SHIPPED | OUTPATIENT
Start: 2022-12-27 | End: 2023-01-28 | Stop reason: SDUPTHER

## 2023-01-27 ENCOUNTER — LAB (OUTPATIENT)
Dept: LAB | Facility: HOSPITAL | Age: 68
End: 2023-01-27
Payer: MEDICARE

## 2023-01-27 PROCEDURE — 83036 HEMOGLOBIN GLYCOSYLATED A1C: CPT | Performed by: INTERNAL MEDICINE

## 2023-01-27 PROCEDURE — 80053 COMPREHEN METABOLIC PANEL: CPT | Performed by: INTERNAL MEDICINE

## 2023-01-28 DIAGNOSIS — G47.09 OTHER INSOMNIA: ICD-10-CM

## 2023-01-30 RX ORDER — ZOLPIDEM TARTRATE 5 MG/1
5 TABLET ORAL NIGHTLY PRN
Qty: 30 TABLET | Refills: 3 | Status: SHIPPED | OUTPATIENT
Start: 2023-01-30 | End: 2023-04-03 | Stop reason: SDUPTHER

## 2023-01-31 ENCOUNTER — OFFICE VISIT (OUTPATIENT)
Dept: INTERNAL MEDICINE | Facility: CLINIC | Age: 68
End: 2023-01-31
Payer: MEDICARE

## 2023-01-31 VITALS
HEIGHT: 71 IN | WEIGHT: 198 LBS | SYSTOLIC BLOOD PRESSURE: 122 MMHG | BODY MASS INDEX: 27.72 KG/M2 | TEMPERATURE: 98.4 F | DIASTOLIC BLOOD PRESSURE: 76 MMHG

## 2023-01-31 DIAGNOSIS — Z00.00 ENCOUNTER FOR SUBSEQUENT ANNUAL WELLNESS VISIT (AWV) IN MEDICARE PATIENT: Primary | ICD-10-CM

## 2023-01-31 DIAGNOSIS — R73.03 PREDIABETES: Chronic | ICD-10-CM

## 2023-01-31 DIAGNOSIS — Z12.5 SCREENING FOR PROSTATE CANCER: ICD-10-CM

## 2023-01-31 DIAGNOSIS — I10 ESSENTIAL HYPERTENSION: Chronic | ICD-10-CM

## 2023-01-31 PROCEDURE — 96160 PT-FOCUSED HLTH RISK ASSMT: CPT | Performed by: INTERNAL MEDICINE

## 2023-01-31 PROCEDURE — G0439 PPPS, SUBSEQ VISIT: HCPCS | Performed by: INTERNAL MEDICINE

## 2023-01-31 PROCEDURE — 1170F FXNL STATUS ASSESSED: CPT | Performed by: INTERNAL MEDICINE

## 2023-01-31 PROCEDURE — 1160F RVW MEDS BY RX/DR IN RCRD: CPT | Performed by: INTERNAL MEDICINE

## 2023-01-31 PROCEDURE — 1159F MED LIST DOCD IN RCRD: CPT | Performed by: INTERNAL MEDICINE

## 2023-01-31 NOTE — PROGRESS NOTES
The ABCs of the Annual Wellness Visit  Subsequent Medicare Wellness Visit    Subjective    Neil Sanders is a 67 y.o. male who presents for a Subsequent Medicare Wellness Visit.  His Bp has been 125-135/80. He denies chest pain or dyspnea. He stopped Crestor.  The following portions of the patient's history were reviewed and   updated as appropriate: allergies, current medications, past family history, past medical history, past social history, past surgical history and problem list.    Compared to one year ago, the patient feels his physical   health is better.    Compared to one year ago, the patient feels his mental   health is the same.    Recent Hospitalizations:  He was not admitted to the hospital during the last year.       Current Medical Providers:  Patient Care Team:  Tucker Zuniga MD as PCP - General (Internal Medicine)    Outpatient Medications Prior to Visit   Medication Sig Dispense Refill   • amLODIPine (NORVASC) 5 MG tablet TAKE ONE TABLET BY MOUTH TWICE DAILY 60 tablet 1   • esomeprazole (nexIUM) 40 MG capsule Take 1 capsule by mouth Daily. 30 capsule 1   • hydroCHLOROthiazide (HYDRODIURIL) 12.5 MG tablet Take 1 tablet by mouth Daily. 30 tablet 4   • sildenafil (Viagra) 100 MG tablet Take 1/2 tablet QD PRN 30 tablet 3   • valsartan (DIOVAN) 160 MG tablet Take 1 tablet by mouth 2 (Two) Times a Day. 180 tablet 1   • zolpidem (AMBIEN) 5 MG tablet Take 1 tablet by mouth At Night As Needed for Sleep. 30 tablet 3   • rosuvastatin (CRESTOR) 5 MG tablet Take 1 tablet by mouth Daily. 90 tablet 0     No facility-administered medications prior to visit.       No opioid medication identified on active medication list. I have reviewed chart for other potential  high risk medication/s and harmful drug interactions in the elderly.          Aspirin is not on active medication list.  Aspirin use is not indicated based on review of current medical condition/s. Risk of harm outweighs potential benefits.   ".    Patient Active Problem List   Diagnosis   • Essential hypertension   • Prediabetes   • Other insomnia   • Other hyperlipidemia     Advance Care Planning  Advance Directive is not on file.  ACP discussion was held with the patient during this visit. Patient has an advance directive (not in EMR), copy requested.     Objective    Vitals:    01/31/23 1609   BP: 122/76   Temp: 98.4 °F (36.9 °C)   TempSrc: Temporal   Weight: 89.8 kg (198 lb)   Height: 180.2 cm (70.95\")     Estimated body mass index is 27.65 kg/m² as calculated from the following:    Height as of this encounter: 180.2 cm (70.95\").    Weight as of this encounter: 89.8 kg (198 lb).    BMI is >= 25 and <30. (Overweight) The following options were offered after discussion;: exercise counseling/recommendations      Does the patient have evidence of cognitive impairment? No    Lab Results   Component Value Date    HGBA1C 6.20 (H) 01/27/2023        HEALTH RISK ASSESSMENT    Smoking Status:  Social History     Tobacco Use   Smoking Status Never   Smokeless Tobacco Never     Alcohol Consumption:  Social History     Substance and Sexual Activity   Alcohol Use Yes    Comment: 1-2 ounces of alcohol per day     Fall Risk Screen:    STEADI Fall Risk Assessment was completed, and patient is at LOW risk for falls.Assessment completed on:1/31/2023    Depression Screening:  PHQ-2/PHQ-9 Depression Screening 1/31/2023   Little Interest or Pleasure in Doing Things 0-->not at all   Feeling Down, Depressed or Hopeless 0-->not at all   PHQ-9: Brief Depression Severity Measure Score 0       Health Habits and Functional and Cognitive Screening:  Functional & Cognitive Status 1/31/2023   Do you have difficulty preparing food and eating? No   Do you have difficulty bathing yourself, getting dressed or grooming yourself? No   Do you have difficulty using the toilet? No   Do you have difficulty moving around from place to place? No   Do you have trouble with steps or getting out " of a bed or a chair? No   Current Diet Well Balanced Diet   Dental Exam Up to date   Eye Exam Up to date   Exercise (times per week) 3 times per week   Current Exercises Include Walking   Current Exercise Activities Include -   Do you need help using the phone?  No   Are you deaf or do you have serious difficulty hearing?  Yes   Do you need help with transportation? No   Do you need help shopping? No   Do you need help preparing meals?  No   Do you need help with housework?  No   Do you need help with laundry? No   Do you need help taking your medications? No   Do you need help managing money? No   Do you ever drive or ride in a car without wearing a seat belt? No   Have you felt unusual stress, anger or loneliness in the last month? No   Who do you live with? Spouse   If you need help, do you have trouble finding someone available to you? No   Have you been bothered in the last four weeks by sexual problems? No   Do you have difficulty concentrating, remembering or making decisions? No       Age-appropriate Screening Schedule:  Refer to the list below for future screening recommendations based on patient's age, sex and/or medical conditions. Orders for these recommended tests are listed in the plan section. The patient has been provided with a written plan.    Health Maintenance   Topic Date Due   • ZOSTER VACCINE (3 of 3) 12/22/2021   • TDAP/TD VACCINES (2 - Td or Tdap) 01/01/2023   • LIPID PANEL  03/30/2023   • INFLUENZA VACCINE  Completed                CMS Preventative Services Quick Reference  Risk Factors Identified During Encounter  Immunizations Discussed/Encouraged: Tdap and Shingrix  The above risks/problems have been discussed with the patient.  Pertinent information has been shared with the patient in the After Visit Summary.  An After Visit Summary and PPPS were made available to the patient.    Follow Up:   Next Medicare Wellness visit to be scheduled in 1 year.       Additional E&M Note during same  "encounter follows:  Patient has multiple medical problems which are significant and separately identifiable that require additional work above and beyond the Medicare Wellness Visit.      Chief Complaint  Medicare Wellness-subsequent    Subjective        HPI  Neil Sanders is also being seen today for Medicare Wellness and HTN.  His Bp has been 125-135/80. He denies chest pain or dyspnea. He stopped Crestor.         Objective   Vital Signs:  /76   Temp 98.4 °F (36.9 °C) (Temporal)   Ht 180.2 cm (70.95\")   Wt 89.8 kg (198 lb)   BMI 27.65 kg/m²     Physical Exam  Vitals reviewed.   Constitutional:       Appearance: He is well-developed.   HENT:      Head: Normocephalic and atraumatic.   Eyes:      Extraocular Movements: Extraocular movements intact.      Conjunctiva/sclera: Conjunctivae normal.      Pupils: Pupils are equal, round, and reactive to light.   Neck:      Thyroid: No thyromegaly.      Vascular: No carotid bruit.   Cardiovascular:      Rate and Rhythm: Normal rate and regular rhythm.      Heart sounds: Normal heart sounds, S1 normal and S2 normal. No murmur heard.  Pulmonary:      Effort: Pulmonary effort is normal.      Breath sounds: Normal breath sounds.   Abdominal:      General: There is no distension.      Palpations: Abdomen is soft. There is no mass.      Tenderness: There is no abdominal tenderness. There is no rebound.   Musculoskeletal:      Cervical back: Neck supple.   Lymphadenopathy:      Cervical: No cervical adenopathy.   Skin:     General: Skin is warm.   Neurological:      Mental Status: He is alert.   Psychiatric:         Behavior: Behavior normal.                         Assessment and Plan   Diagnoses and all orders for this visit:    1. Encounter for subsequent annual wellness visit (AWV) in Medicare patient (Primary)    2. Essential hypertension  -     Comprehensive Metabolic Panel; Future  -     Lipid Panel With / Chol / HDL Ratio; Future    3. Prediabetes  -     " Hemoglobin A1c; Future    4. Screening for prostate cancer  -     PSA Screen; Future             Follow Up   Return in about 6 months (around 7/31/2023), or 30 minutes, for Lab Before [facility].  Patient was given instructions and counseling regarding his condition or for health maintenance advice. Please see specific information pulled into the AVS if appropriate.       Reviewed CMP and A1c. He will be due for csope in December. Discussed second Shingrix and Tdap. BP is good.

## 2023-02-24 DIAGNOSIS — I10 ESSENTIAL HYPERTENSION: ICD-10-CM

## 2023-02-24 DIAGNOSIS — G47.09 OTHER INSOMNIA: ICD-10-CM

## 2023-02-24 RX ORDER — AMLODIPINE BESYLATE 5 MG/1
TABLET ORAL
Qty: 60 TABLET | Refills: 1 | Status: SHIPPED | OUTPATIENT
Start: 2023-02-24

## 2023-02-24 RX ORDER — ZOLPIDEM TARTRATE 5 MG/1
5 TABLET ORAL NIGHTLY PRN
Qty: 30 TABLET | Refills: 3 | OUTPATIENT
Start: 2023-02-24

## 2023-02-24 RX ORDER — VALSARTAN 160 MG/1
160 TABLET ORAL 2 TIMES DAILY
Qty: 180 TABLET | Refills: 1 | Status: SHIPPED | OUTPATIENT
Start: 2023-02-24

## 2023-04-03 DIAGNOSIS — G47.09 OTHER INSOMNIA: ICD-10-CM

## 2023-04-04 RX ORDER — ZOLPIDEM TARTRATE 5 MG/1
5 TABLET ORAL NIGHTLY PRN
Qty: 30 TABLET | Refills: 3 | Status: SHIPPED | OUTPATIENT
Start: 2023-04-04

## 2023-04-28 DIAGNOSIS — I10 ESSENTIAL HYPERTENSION: ICD-10-CM

## 2023-04-28 RX ORDER — AMLODIPINE BESYLATE 5 MG/1
TABLET ORAL
Qty: 60 TABLET | Refills: 1 | Status: SHIPPED | OUTPATIENT
Start: 2023-04-28

## 2023-05-07 DIAGNOSIS — G47.09 OTHER INSOMNIA: ICD-10-CM

## 2023-05-07 DIAGNOSIS — I10 ESSENTIAL HYPERTENSION: ICD-10-CM

## 2023-05-08 DIAGNOSIS — G47.09 OTHER INSOMNIA: ICD-10-CM

## 2023-05-08 RX ORDER — VALSARTAN 160 MG/1
160 TABLET ORAL 2 TIMES DAILY
Qty: 180 TABLET | Refills: 1 | OUTPATIENT
Start: 2023-05-08

## 2023-05-08 RX ORDER — ZOLPIDEM TARTRATE 5 MG/1
5 TABLET ORAL NIGHTLY PRN
Qty: 30 TABLET | Refills: 3 | Status: CANCELLED | OUTPATIENT
Start: 2023-05-08

## 2023-05-08 RX ORDER — ZOLPIDEM TARTRATE 5 MG/1
5 TABLET ORAL NIGHTLY PRN
Qty: 30 TABLET | Refills: 3 | OUTPATIENT
Start: 2023-05-08

## 2023-05-08 RX ORDER — AMLODIPINE BESYLATE 5 MG/1
5 TABLET ORAL 2 TIMES DAILY
Qty: 60 TABLET | Refills: 1 | OUTPATIENT
Start: 2023-05-08

## 2023-05-08 NOTE — TELEPHONE ENCOUNTER
Caller: Neil Sanders    Relationship: Self    Best call back number: [phone]    Requested Prescriptions:   Requested Prescriptions     Pending Prescriptions Disp Refills   • zolpidem (AMBIEN) 5 MG tablet 30 tablet 3     Sig: Take 1 tablet by mouth At Night As Needed for Sleep.        Pharmacy where request should be sent: [facility] [id number] [place] [id number] [facility]  AT [address] [phone] PH - [phone]      Last office visit with prescribing clinician: 1/31/2023   Last telemedicine visit with prescribing clinician: 6/1/2023   Next office visit with prescribing clinician: 6/1/2023     Additional details provided by patient: PATIENT HAS MEDICATION REFILL VISIT SCHEDULED FOR FIRST AVAILABLE ON 06/01/23. HE IS CURRENTLY OUT OF THE COUNTRY IN [place] AND WILL RETURN ON 05/15/23.     PATIENT IS OUT OF THIS MEDICATION.     Does the patient have less than a 3 day supply:  [x] Yes  [] No    Would you like a call back once the refill request has been completed: [] Yes [x] No    If the office needs to give you a call back, can they leave a voicemail: [x] Yes [] No    Tahmina Aden Rep   05/08/23 09:35 EDT

## 2023-05-11 DIAGNOSIS — G47.09 OTHER INSOMNIA: ICD-10-CM

## 2023-05-11 DIAGNOSIS — I10 ESSENTIAL HYPERTENSION: ICD-10-CM

## 2023-05-11 RX ORDER — VALSARTAN 160 MG/1
160 TABLET ORAL 2 TIMES DAILY
Qty: 180 TABLET | Refills: 1 | Status: SHIPPED | OUTPATIENT
Start: 2023-05-11

## 2023-05-11 RX ORDER — ZOLPIDEM TARTRATE 5 MG/1
5 TABLET ORAL NIGHTLY PRN
Qty: 30 TABLET | Refills: 3 | Status: SHIPPED | OUTPATIENT
Start: 2023-05-11

## 2023-05-11 RX ORDER — AMLODIPINE BESYLATE 5 MG/1
5 TABLET ORAL 2 TIMES DAILY
Qty: 60 TABLET | Refills: 1 | Status: SHIPPED | OUTPATIENT
Start: 2023-05-11

## 2023-05-11 RX ORDER — VALSARTAN 160 MG/1
160 TABLET ORAL 2 TIMES DAILY
Qty: 180 TABLET | Refills: 1 | Status: CANCELLED | OUTPATIENT
Start: 2023-05-11

## 2023-05-11 RX ORDER — AMLODIPINE BESYLATE 5 MG/1
5 TABLET ORAL 2 TIMES DAILY
Qty: 60 TABLET | Refills: 1 | Status: CANCELLED | OUTPATIENT
Start: 2023-05-11

## 2023-05-18 DIAGNOSIS — G47.09 OTHER INSOMNIA: ICD-10-CM

## 2023-05-18 DIAGNOSIS — I10 ESSENTIAL HYPERTENSION: ICD-10-CM

## 2023-05-18 RX ORDER — AMLODIPINE BESYLATE 5 MG/1
5 TABLET ORAL 2 TIMES DAILY
Qty: 60 TABLET | Refills: 1 | Status: SHIPPED | OUTPATIENT
Start: 2023-05-18

## 2023-05-18 RX ORDER — VALSARTAN 160 MG/1
160 TABLET ORAL 2 TIMES DAILY
Qty: 180 TABLET | Refills: 1 | Status: SHIPPED | OUTPATIENT
Start: 2023-05-18

## 2023-05-18 RX ORDER — ZOLPIDEM TARTRATE 5 MG/1
5 TABLET ORAL NIGHTLY PRN
Qty: 30 TABLET | Refills: 3 | Status: SHIPPED | OUTPATIENT
Start: 2023-05-18

## 2023-05-18 NOTE — TELEPHONE ENCOUNTER
Caller: Neil Sanders    Relationship: Self    Best call back number: [phone]    Requested Prescriptions:   Requested Prescriptions     Pending Prescriptions Disp Refills   • valsartan (DIOVAN) 160 MG tablet 180 tablet 1     Sig: Take 1 tablet by mouth 2 (Two) Times a Day.   • amLODIPine (NORVASC) 5 MG tablet 60 tablet 1     Sig: Take 1 tablet by mouth 2 (Two) Times a Day.   • zolpidem (AMBIEN) 5 MG tablet 30 tablet 3     Sig: Take 1 tablet by mouth At Night As Needed for Sleep.        Pharmacy where request should be sent: [facility] [id number] [place] [id number] [facility]  AT [address] [phone] [facility] [phone]      Last office visit with prescribing clinician: 1/31/2023   Last telemedicine visit with prescribing clinician: 5/11/2023   Next office visit with prescribing clinician: 6/1/2023     Additional details provided by patient: COMPLETELY OUT OF ZOLPIDEM    Does the patient have less than a 3 day supply:  [x] Yes  [] No    Would you like a call back once the refill request has been completed: [] Yes [x] No    If the office needs to give you a call back, can they leave a voicemail: [] Yes [x] No    Tahmina Grissom Rep   05/18/23 10:26 EDT

## 2023-06-16 DIAGNOSIS — R09.89 THROAT CLEARING: ICD-10-CM

## 2023-06-16 DIAGNOSIS — G47.09 OTHER INSOMNIA: ICD-10-CM

## 2023-06-16 RX ORDER — ESOMEPRAZOLE MAGNESIUM 40 MG/1
40 CAPSULE, DELAYED RELEASE ORAL DAILY
Qty: 30 CAPSULE | Refills: 1 | Status: SHIPPED | OUTPATIENT
Start: 2023-06-16

## 2023-06-16 RX ORDER — ZOLPIDEM TARTRATE 5 MG/1
5 TABLET ORAL NIGHTLY PRN
Qty: 30 TABLET | Refills: 3 | Status: SHIPPED | OUTPATIENT
Start: 2023-06-16

## 2023-07-24 DIAGNOSIS — G47.09 OTHER INSOMNIA: ICD-10-CM

## 2023-07-24 RX ORDER — ZOLPIDEM TARTRATE 5 MG/1
5 TABLET ORAL NIGHTLY PRN
Qty: 30 TABLET | Refills: 3 | OUTPATIENT
Start: 2023-07-24

## 2023-08-14 DIAGNOSIS — R09.89 THROAT CLEARING: Primary | ICD-10-CM

## 2023-08-14 DIAGNOSIS — K21.9 GASTROESOPHAGEAL REFLUX DISEASE, UNSPECIFIED WHETHER ESOPHAGITIS PRESENT: ICD-10-CM

## 2023-08-18 DIAGNOSIS — R09.89 THROAT CLEARING: ICD-10-CM

## 2023-08-18 RX ORDER — ESOMEPRAZOLE MAGNESIUM 40 MG/1
40 CAPSULE, DELAYED RELEASE ORAL DAILY
Qty: 30 CAPSULE | Refills: 1 | Status: SHIPPED | OUTPATIENT
Start: 2023-08-18

## 2023-08-22 ENCOUNTER — LAB (OUTPATIENT)
Dept: LAB | Facility: HOSPITAL | Age: 68
End: 2023-08-22
Payer: MEDICARE

## 2023-08-22 DIAGNOSIS — G47.09 OTHER INSOMNIA: ICD-10-CM

## 2023-08-22 PROCEDURE — 80061 LIPID PANEL: CPT | Performed by: INTERNAL MEDICINE

## 2023-08-22 PROCEDURE — 83036 HEMOGLOBIN GLYCOSYLATED A1C: CPT | Performed by: INTERNAL MEDICINE

## 2023-08-22 PROCEDURE — G0103 PSA SCREENING: HCPCS | Performed by: INTERNAL MEDICINE

## 2023-08-22 PROCEDURE — 80053 COMPREHEN METABOLIC PANEL: CPT | Performed by: INTERNAL MEDICINE

## 2023-08-22 RX ORDER — ZOLPIDEM TARTRATE 5 MG/1
5 TABLET ORAL NIGHTLY PRN
Qty: 30 TABLET | Refills: 3 | OUTPATIENT
Start: 2023-08-22

## 2023-08-25 ENCOUNTER — HOSPITAL ENCOUNTER (OUTPATIENT)
Facility: HOSPITAL | Age: 68
Discharge: HOME | End: 2023-08-25
Admitting: INTERNAL MEDICINE
Payer: MEDICARE

## 2023-08-25 ENCOUNTER — OFFICE VISIT (OUTPATIENT)
Dept: INTERNAL MEDICINE | Facility: CLINIC | Age: 68
End: 2023-08-25
Payer: MEDICARE

## 2023-08-25 VITALS
WEIGHT: 195.6 LBS | BODY MASS INDEX: 27.38 KG/M2 | SYSTOLIC BLOOD PRESSURE: 140 MMHG | DIASTOLIC BLOOD PRESSURE: 92 MMHG | TEMPERATURE: 97.5 F | HEIGHT: 71 IN

## 2023-08-25 DIAGNOSIS — I10 ESSENTIAL HYPERTENSION: Primary | Chronic | ICD-10-CM

## 2023-08-25 DIAGNOSIS — R09.89 THROAT CLEARING: ICD-10-CM

## 2023-08-25 DIAGNOSIS — R97.20 INCREASED PROSTATE SPECIFIC ANTIGEN (PSA) VELOCITY: ICD-10-CM

## 2023-08-25 DIAGNOSIS — R73.03 PREDIABETES: Chronic | ICD-10-CM

## 2023-08-25 DIAGNOSIS — E78.49 OTHER HYPERLIPIDEMIA: Chronic | ICD-10-CM

## 2023-08-25 PROCEDURE — 3080F DIAST BP >= 90 MM HG: CPT | Performed by: INTERNAL MEDICINE

## 2023-08-25 PROCEDURE — 3077F SYST BP >= 140 MM HG: CPT | Performed by: INTERNAL MEDICINE

## 2023-08-25 PROCEDURE — 99214 OFFICE O/P EST MOD 30 MIN: CPT | Performed by: INTERNAL MEDICINE

## 2023-08-25 PROCEDURE — 1159F MED LIST DOCD IN RCRD: CPT | Performed by: INTERNAL MEDICINE

## 2023-08-25 PROCEDURE — 71046 X-RAY EXAM CHEST 2 VIEWS: CPT

## 2023-08-25 PROCEDURE — 1160F RVW MEDS BY RX/DR IN RCRD: CPT | Performed by: INTERNAL MEDICINE

## 2023-08-25 RX ORDER — HYDROCHLOROTHIAZIDE 25 MG/1
25 TABLET ORAL DAILY
Qty: 30 TABLET | Refills: 3 | Status: SHIPPED | OUTPATIENT
Start: 2023-08-25

## 2023-08-25 NOTE — PROGRESS NOTES
Subjective        Chief Complaint   Patient presents with    Hypertension           Neil Sanders is a 67 y.o. male who presents for    Patient Active Problem List   Diagnosis    Essential hypertension    Prediabetes    Other insomnia    Other hyperlipidemia       History of Present Illness       He had COVID in April. He had fatigue and he has to constantly clear his chest since mid July (the throat clearing is not new). He was prescribed Zmax in June by his wife; he felt better after that. He sees ENT in Sept. He denies fever or chills. He has a non productive cough. He feels some post nasal drip. He tried zyrtec with some relief. He has noticed improvement in the last 3 days.He thinks his throat clearing started with starting Valsartan; he is not sure if it has helped. He is not having GERD. His last BP was 140/80.     He denies dysuria or FH prostate disease. He gets up once per night to urinate.  No Known Allergies    Current Outpatient Medications on File Prior to Visit   Medication Sig Dispense Refill    amLODIPine (NORVASC) 5 MG tablet TAKE 1 TABLET BY MOUTH TWICE A DAY 60 tablet 1    esomeprazole (nexIUM) 40 MG capsule TAKE 1 CAPSULE BY MOUTH DAILY 30 capsule 1    zolpidem (AMBIEN) 5 MG tablet Take 1 tablet by mouth At Night As Needed for Sleep. 30 tablet 3    [DISCONTINUED] hydroCHLOROthiazide (HYDRODIURIL) 12.5 MG tablet Take 1 tablet by mouth Daily. 30 tablet 4    sildenafil (Viagra) 100 MG tablet Take 1/2 tablet QD PRN (Patient not taking: Reported on 8/25/2023) 30 tablet 3    valsartan (DIOVAN) 160 MG tablet Take 1 tablet by mouth 2 (Two) Times a Day. (Patient not taking: Reported on 8/25/2023) 180 tablet 1     No current facility-administered medications on file prior to visit.       Past Medical History:   Diagnosis Date    Allergic rhinitis     COVID-19 06/2022    COVID-19 04/2023    Erectile dysfunction     Inability to attain erection        Past Surgical History:   Procedure Laterality Date     "COLONOSCOPY  12/17/2013    repeat 10 years Dr. Valdivia    HEMORRHOIDECTOMY      UMBILICAL HERNIA REPAIR         Family History   Problem Relation Age of Onset    Other Mother         cerebral aneurysm    Cancer Father         throat       Social History     Socioeconomic History    Marital status:    Tobacco Use    Smoking status: Never    Smokeless tobacco: Never   Vaping Use    Vaping Use: Never used   Substance and Sexual Activity    Alcohol use: Yes     Comment: 1-2 ounces of alcohol per day    Drug use: Never    Sexual activity: Never           The following portions of the patient's history were reviewed and updated as appropriate: problem list, allergies, current medications, past medical history, past family history, past social history, and past surgical history.    Review of Systems    Immunization History   Administered Date(s) Administered    COVID-19 (PFIZER) BIVALENT 12+YRS 09/26/2022    COVID-19 (PFIZER) Purple Cap Monovalent 01/28/2021, 02/17/2021, 10/05/2021    Covid-19 (Pfizer) Gray Cap Monovalent 04/09/2022    DTaP 01/01/2013    Flu Vaccine Split Quad 10/15/2018    Fluad Quad 65+ 10/27/2021    Fluzone High Dose =>65 Years ([employer] ONLY) 01/03/2020, 08/23/2020, 10/11/2022    Pneumococcal Conjugate 13-Valent (PCV13) 01/13/2020    Pneumococcal Polysaccharide (PPSV23) 03/04/2021    Shingrix 10/27/2021    Tdap 01/01/2013    Zostavax 03/16/2015       Objective   Vitals:    08/25/23 0813   BP: 140/92   Temp: 97.5 øF (36.4 øC)   Weight: 88.7 kg (195 lb 9.6 oz)   Height: 180.3 cm (71\")     Body mass index is 27.28 kg/mý.  Physical Exam  Vitals reviewed.   Constitutional:       Appearance: He is well-developed.   HENT:      Head: Normocephalic and atraumatic.      Mouth/Throat:      Comments: Evidence of very light yellow PND  Neck:      Thyroid: No thyromegaly.   Cardiovascular:      Rate and Rhythm: Normal rate and regular rhythm.      Heart sounds: Normal heart sounds, S1 normal and S2 normal. "   Pulmonary:      Effort: Pulmonary effort is normal.      Breath sounds: Normal breath sounds.   Genitourinary:     Comments: Declines rectal exam  Lymphadenopathy:      Cervical: No cervical adenopathy.   Skin:     General: Skin is warm.   Neurological:      Mental Status: He is alert.   Psychiatric:         Behavior: Behavior normal.       Procedures    Assessment & Plan   Diagnoses and all orders for this visit:    1. Essential hypertension (Primary)  -     hydroCHLOROthiazide (HYDRODIURIL) 25 MG tablet; Take 1 tablet by mouth Daily.  Dispense: 30 tablet; Refill: 3  -     Basic metabolic panel; Future    2. Other hyperlipidemia  Comments:  Declines statin. Discussed increasing exercise    3. Prediabetes  Comments:  stable    4. Throat clearing  Comments:  Start Flonase. Sees ENT in Sept.  Orders:  -     XR Chest PA & Lateral    5. Increased prostate specific antigen (PSA) velocity  -     Urinalysis With Culture If Indicated -; Future        Reviewed cmp, flp, psa and A1c. Increase HCTZ since stopped valsartan.     Discussed implications of bump in PSA; he declines rectal exam. Repeat PSA in 3 months.            Return in about 4 weeks (around 9/22/2023), or 30 minutes.

## 2023-09-14 ENCOUNTER — LAB (OUTPATIENT)
Facility: HOSPITAL | Age: 68
End: 2023-09-14
Payer: MEDICARE

## 2023-09-14 PROCEDURE — 80048 BASIC METABOLIC PNL TOTAL CA: CPT | Performed by: INTERNAL MEDICINE

## 2023-09-14 PROCEDURE — 81003 URINALYSIS AUTO W/O SCOPE: CPT | Performed by: INTERNAL MEDICINE

## 2023-09-22 DIAGNOSIS — G47.09 OTHER INSOMNIA: ICD-10-CM

## 2023-09-25 RX ORDER — ZOLPIDEM TARTRATE 5 MG/1
5 TABLET ORAL NIGHTLY PRN
Qty: 30 TABLET | Refills: 3 | Status: SHIPPED | OUTPATIENT
Start: 2023-09-25

## 2023-10-21 DIAGNOSIS — R09.89 THROAT CLEARING: ICD-10-CM

## 2023-10-23 RX ORDER — ESOMEPRAZOLE MAGNESIUM 40 MG/1
40 CAPSULE, DELAYED RELEASE ORAL DAILY
Qty: 30 CAPSULE | Refills: 1 | Status: SHIPPED | OUTPATIENT
Start: 2023-10-23

## 2023-11-03 DIAGNOSIS — I10 ESSENTIAL HYPERTENSION: Chronic | ICD-10-CM

## 2023-11-03 DIAGNOSIS — G47.09 OTHER INSOMNIA: ICD-10-CM

## 2023-11-03 RX ORDER — HYDROCHLOROTHIAZIDE 25 MG/1
25 TABLET ORAL DAILY
Qty: 30 TABLET | Refills: 3 | Status: SHIPPED | OUTPATIENT
Start: 2023-11-03

## 2023-11-03 RX ORDER — AMLODIPINE BESYLATE 5 MG/1
5 TABLET ORAL 2 TIMES DAILY
Qty: 60 TABLET | Refills: 1 | Status: SHIPPED | OUTPATIENT
Start: 2023-11-03

## 2023-11-03 RX ORDER — ZOLPIDEM TARTRATE 5 MG/1
5 TABLET ORAL NIGHTLY PRN
Qty: 30 TABLET | Refills: 3 | Status: SHIPPED | OUTPATIENT
Start: 2023-11-03

## 2023-11-19 DIAGNOSIS — I10 ESSENTIAL HYPERTENSION: ICD-10-CM

## 2023-11-20 RX ORDER — VALSARTAN 160 MG/1
160 TABLET ORAL 2 TIMES DAILY
Qty: 180 TABLET | Refills: 1 | OUTPATIENT
Start: 2023-11-20

## 2023-12-01 RX ORDER — AMLODIPINE BESYLATE 5 MG/1
5 TABLET ORAL 2 TIMES DAILY
Qty: 60 TABLET | Refills: 1 | Status: SHIPPED | OUTPATIENT
Start: 2023-12-01

## 2023-12-28 DIAGNOSIS — N52.9 ERECTILE DYSFUNCTION, UNSPECIFIED ERECTILE DYSFUNCTION TYPE: ICD-10-CM

## 2023-12-28 RX ORDER — SILDENAFIL 100 MG/1
TABLET, FILM COATED ORAL
Qty: 30 TABLET | Refills: 0 | Status: SHIPPED | OUTPATIENT
Start: 2023-12-28

## 2024-01-02 DIAGNOSIS — R09.89 THROAT CLEARING: ICD-10-CM

## 2024-01-02 RX ORDER — ESOMEPRAZOLE MAGNESIUM 40 MG/1
40 CAPSULE, DELAYED RELEASE ORAL DAILY
Qty: 14 CAPSULE | Refills: 0 | Status: SHIPPED | OUTPATIENT
Start: 2024-01-02

## 2024-01-03 DIAGNOSIS — M25.511 ACUTE PAIN OF RIGHT SHOULDER: Primary | ICD-10-CM

## 2024-01-03 DIAGNOSIS — I10 ESSENTIAL HYPERTENSION: ICD-10-CM

## 2024-01-03 RX ORDER — VALSARTAN 160 MG/1
160 TABLET ORAL 2 TIMES DAILY
Qty: 14 TABLET | Refills: 0 | Status: SHIPPED | OUTPATIENT
Start: 2024-01-03

## 2024-01-12 DIAGNOSIS — R09.89 THROAT CLEARING: ICD-10-CM

## 2024-01-12 RX ORDER — ESOMEPRAZOLE MAGNESIUM 40 MG/1
40 CAPSULE, DELAYED RELEASE ORAL DAILY
Qty: 14 CAPSULE | Refills: 0 | OUTPATIENT
Start: 2024-01-12

## 2024-02-03 DIAGNOSIS — I10 ESSENTIAL HYPERTENSION: ICD-10-CM

## 2024-02-05 RX ORDER — VALSARTAN 160 MG/1
160 TABLET ORAL 2 TIMES DAILY
Qty: 180 TABLET | Refills: 0 | Status: SHIPPED | OUTPATIENT
Start: 2024-02-05

## 2024-02-29 DIAGNOSIS — R73.03 PREDIABETES: Primary | ICD-10-CM

## 2024-03-05 ENCOUNTER — LAB (OUTPATIENT)
Facility: HOSPITAL | Age: 69
End: 2024-03-05
Payer: COMMERCIAL

## 2024-03-05 LAB
ALBUMIN SERPL-MCNC: 4.1 G/DL (ref 3.5–5.2)
ALBUMIN/GLOB SERPL: 2 G/DL
ALP SERPL-CCNC: 75 U/L (ref 39–117)
ALT SERPL W P-5'-P-CCNC: 20 U/L (ref 1–41)
ANION GAP SERPL CALCULATED.3IONS-SCNC: 12.5 MMOL/L (ref 5–15)
AST SERPL-CCNC: 17 U/L (ref 1–40)
BILIRUB SERPL-MCNC: 0.6 MG/DL (ref 0–1.2)
BUN SERPL-MCNC: 12 MG/DL (ref 8–23)
BUN/CREAT SERPL: 13 (ref 7–25)
CALCIUM SPEC-SCNC: 8.9 MG/DL (ref 8.6–10.5)
CHLORIDE SERPL-SCNC: 106 MMOL/L (ref 98–107)
CO2 SERPL-SCNC: 24.5 MMOL/L (ref 22–29)
CREAT SERPL-MCNC: 0.92 MG/DL (ref 0.76–1.27)
EGFRCR SERPLBLD CKD-EPI 2021: 90 ML/MIN/1.73
GLOBULIN UR ELPH-MCNC: 2.1 GM/DL
GLUCOSE SERPL-MCNC: 142 MG/DL (ref 65–99)
HBA1C MFR BLD: 6.8 % (ref 4.8–5.6)
POTASSIUM SERPL-SCNC: 3.9 MMOL/L (ref 3.5–5.2)
PROT SERPL-MCNC: 6.2 G/DL (ref 6–8.5)
SODIUM SERPL-SCNC: 143 MMOL/L (ref 136–145)

## 2024-03-05 PROCEDURE — 83036 HEMOGLOBIN GLYCOSYLATED A1C: CPT | Performed by: INTERNAL MEDICINE

## 2024-03-05 PROCEDURE — 80053 COMPREHEN METABOLIC PANEL: CPT | Performed by: INTERNAL MEDICINE

## 2024-03-07 ENCOUNTER — OFFICE VISIT (OUTPATIENT)
Dept: INTERNAL MEDICINE | Facility: CLINIC | Age: 69
End: 2024-03-07
Payer: COMMERCIAL

## 2024-03-07 VITALS
SYSTOLIC BLOOD PRESSURE: 140 MMHG | BODY MASS INDEX: 27.72 KG/M2 | WEIGHT: 198 LBS | DIASTOLIC BLOOD PRESSURE: 80 MMHG | HEIGHT: 71 IN

## 2024-03-07 DIAGNOSIS — M25.511 RIGHT SHOULDER PAIN, UNSPECIFIED CHRONICITY: ICD-10-CM

## 2024-03-07 DIAGNOSIS — I10 ESSENTIAL HYPERTENSION: Primary | Chronic | ICD-10-CM

## 2024-03-07 DIAGNOSIS — E78.49 OTHER HYPERLIPIDEMIA: Chronic | ICD-10-CM

## 2024-03-07 DIAGNOSIS — E11.9 TYPE 2 DIABETES MELLITUS WITHOUT COMPLICATION, WITHOUT LONG-TERM CURRENT USE OF INSULIN: ICD-10-CM

## 2024-03-07 RX ORDER — VALSARTAN 80 MG/1
80 TABLET ORAL DAILY
Qty: 90 TABLET | Refills: 1 | Status: SHIPPED | OUTPATIENT
Start: 2024-03-07

## 2024-03-07 NOTE — PROGRESS NOTES
Subjective        Chief Complaint   Patient presents with    Hypertension           Neil Sanders is a 68 y.o. male who presents for    Patient Active Problem List   Diagnosis    Essential hypertension    Other insomnia    Other hyperlipidemia    Type 2 diabetes mellitus without complication, without long-term current use of insulin       History of Present Illness     He did PT for four sessions for his right shoulder and it helped. He last did it in January. He is doing exercises for it at home. He denies chest pain or dyspnea. He has not been checking his BP. He pulled his right calf muscle playing tennis; it is 50 percent better. He eats two meals per night. He does not snack. He is eating more carbs. He saw ENT who thought his throat clearing was caused by reflux.    No Known Allergies    Current Outpatient Medications on File Prior to Visit   Medication Sig Dispense Refill    amLODIPine (NORVASC) 5 MG tablet TAKE 1 TABLET BY MOUTH TWICE A DAY 60 tablet 1    esomeprazole (nexIUM) 40 MG capsule TAKE 1 CAPSULE BY MOUTH DAILY 14 capsule 0    hydroCHLOROthiazide (HYDRODIURIL) 25 MG tablet Take 1 tablet by mouth Daily. 30 tablet 3    sildenafil (Viagra) 100 MG tablet Take 1/2 tablet QD PRN 30 tablet 0    zolpidem (AMBIEN) 5 MG tablet Take 1 tablet by mouth At Night As Needed for Sleep. 30 tablet 3    [DISCONTINUED] valsartan (DIOVAN) 160 MG tablet TAKE 1 TABLET BY MOUTH TWICE A DAY (Patient not taking: Reported on 3/7/2024) 180 tablet 0     No current facility-administered medications on file prior to visit.       Past Medical History:   Diagnosis Date    Allergic rhinitis     COVID-19 06/2022    COVID-19 04/2023    Erectile dysfunction     Inability to attain erection        Past Surgical History:   Procedure Laterality Date    CATARACT EXTRACTION  2023    COLONOSCOPY  12/17/2013    repeat 10 years Dr. Valdivia    HEMORRHOIDECTOMY      UMBILICAL HERNIA REPAIR         Family History   Problem Relation Age of Onset  "   Other Mother         cerebral aneurysm    Cancer Father         throat       Social History     Socioeconomic History    Marital status:    Tobacco Use    Smoking status: Never    Smokeless tobacco: Never   Vaping Use    Vaping status: Never Used   Substance and Sexual Activity    Alcohol use: Yes     Comment: 1-2 ounces of alcohol per day    Drug use: Never    Sexual activity: Never           The following portions of the patient's history were reviewed and updated as appropriate: problem list, allergies, current medications, past medical history, past family history, past social history, and past surgical history.    Review of Systems    Immunization History   Administered Date(s) Administered    COVID-19 (PFIZER) BIVALENT 12+YRS 09/26/2022    COVID-19 (PFIZER) Purple Cap Monovalent 01/28/2021, 02/17/2021, 10/05/2021    Covid-19 (Pfizer) Gray Cap Monovalent 04/09/2022    DTaP 01/01/2013    Flu Vaccine Split Quad 10/15/2018    Fluad Quad 65+ 10/27/2021    Fluzone High Dose =>65 Years ([employer] ONLY) 01/03/2020, 08/23/2020, 10/11/2022    Influenza, Unspecified 11/19/2023    Pneumococcal Conjugate 13-Valent (PCV13) 01/13/2020    Pneumococcal Polysaccharide (PPSV23) 03/04/2021    Shingrix 10/27/2021, 11/20/2023    Tdap 01/01/2013    Zostavax 03/16/2015       Objective   Vitals:    03/07/24 1537   BP: 140/80   Weight: 89.8 kg (198 lb)   Height: 180.3 cm (70.98\")     Body mass index is 27.63 kg/m².  Physical Exam  Vitals reviewed.   Constitutional:       Appearance: He is well-developed.   HENT:      Head: Normocephalic and atraumatic.   Cardiovascular:      Rate and Rhythm: Normal rate and regular rhythm.      Heart sounds: Normal heart sounds, S1 normal and S2 normal.   Pulmonary:      Effort: Pulmonary effort is normal.      Breath sounds: Normal breath sounds.   Musculoskeletal:      Comments: Right shoulder good ROM without crepitus.   Skin:     General: Skin is warm.   Neurological:      Mental Status: He " is alert.   Psychiatric:         Behavior: Behavior normal.         Procedures    Assessment & Plan   Diagnoses and all orders for this visit:    1. Essential hypertension (Primary)  -     valsartan (Diovan) 80 MG tablet; Take 1 tablet by mouth Daily.  Dispense: 90 tablet; Refill: 1  -     Comprehensive Metabolic Panel; Future    2. Other hyperlipidemia  -     Lipid Panel With / Chol / HDL Ratio; Future    3. Type 2 diabetes mellitus without complication, without long-term current use of insulin  -     Ambulatory Referral to Diabetic Education  -     Hemoglobin A1c; Future  -     Microalbumin / Creatinine Urine Ratio - Urine, Clean Catch; Future    4. Right shoulder pain, unspecified chronicity  -     Ambulatory Referral to Physical Therapy Evaluate and treat    Other orders  -     Tdap Vaccine Greater Than or Equal To 8yo IM               Reviewed cmp and A1c. He is type 2 diabetic now. Wrote for glucometer and supplies. Add small dose of diovan; if throat clearing reoccurs then I would stop it. Discussed decreasing carbs and increase cardio. He wants to r/s PT.    Return in about 3 months (around 6/7/2024) for Annual physical, Lab Before FUP.

## 2024-03-22 ENCOUNTER — OFFICE VISIT (OUTPATIENT)
Dept: INTERNAL MEDICINE | Facility: CLINIC | Age: 69
End: 2024-03-22
Payer: COMMERCIAL

## 2024-03-22 VITALS — HEIGHT: 71 IN | BODY MASS INDEX: 26.96 KG/M2 | WEIGHT: 192.6 LBS

## 2024-03-22 DIAGNOSIS — M79.661 RIGHT CALF PAIN: Primary | ICD-10-CM

## 2024-03-22 DIAGNOSIS — E11.9 TYPE 2 DIABETES MELLITUS WITHOUT COMPLICATION, WITHOUT LONG-TERM CURRENT USE OF INSULIN: ICD-10-CM

## 2024-03-22 NOTE — PROGRESS NOTES
Subjective        Chief Complaint   Patient presents with    Edema           Neil Sanders is a 68 y.o. male who presents for    Patient Active Problem List   Diagnosis    Essential hypertension    Other insomnia    Other hyperlipidemia    Type 2 diabetes mellitus without complication, without long-term current use of insulin       History of Present Illness       He had a calf injury earlier in the month after playing pickle ball. He had been [place] the day before. He has been wrapping. He has pain in the calf. It hurts with every step. He denies chest pain, dyspnea, melena or hematochezia. He denies FH of blood clots.     He has lost 6-7 pounds since last OV.   No Known Allergies    Current Outpatient Medications on File Prior to Visit   Medication Sig Dispense Refill    amLODIPine (NORVASC) 5 MG tablet TAKE 1 TABLET BY MOUTH TWICE A DAY 60 tablet 1    esomeprazole (nexIUM) 40 MG capsule TAKE 1 CAPSULE BY MOUTH DAILY 14 capsule 0    hydroCHLOROthiazide (HYDRODIURIL) 25 MG tablet Take 1 tablet by mouth Daily. 30 tablet 3    sildenafil (Viagra) 100 MG tablet Take 1/2 tablet QD PRN 30 tablet 0    valsartan (Diovan) 80 MG tablet Take 1 tablet by mouth Daily. 90 tablet 1    zolpidem (AMBIEN) 5 MG tablet Take 1 tablet by mouth At Night As Needed for Sleep. 30 tablet 3     No current facility-administered medications on file prior to visit.       Past Medical History:   Diagnosis Date    Allergic rhinitis     COVID-19 06/2022    COVID-19 04/2023    Erectile dysfunction     Inability to attain erection        Past Surgical History:   Procedure Laterality Date    CATARACT EXTRACTION  2023    COLONOSCOPY  12/17/2013    repeat 10 years Dr. Valdivia    HEMORRHOIDECTOMY      UMBILICAL HERNIA REPAIR         Family History   Problem Relation Age of Onset    Other Mother         cerebral aneurysm    Cancer Father         throat       Social History     Socioeconomic History    Marital status:    Tobacco Use    Smoking  "status: Never    Smokeless tobacco: Never   Vaping Use    Vaping status: Never Used   Substance and Sexual Activity    Alcohol use: Yes     Comment: 1-2 ounces of alcohol per day    Drug use: Never    Sexual activity: Never           The following portions of the patient's history were reviewed and updated as appropriate: problem list, allergies, current medications, past medical history, past family history, past social history, and past surgical history.    Review of Systems    Immunization History   Administered Date(s) Administered    COVID-19 (PFIZER) BIVALENT 12+YRS 09/26/2022    COVID-19 (PFIZER) Purple Cap Monovalent 01/28/2021, 02/17/2021, 10/05/2021    Covid-19 (Pfizer) Gray Cap Monovalent 04/09/2022    DTaP 01/01/2013    Flu Vaccine Split Quad 10/15/2018    Fluad Quad 65+ 10/27/2021    Fluzone High Dose =>65 Years ([employer] ONLY) 01/03/2020, 08/23/2020, 10/11/2022    Influenza, Unspecified 11/19/2023    Pneumococcal Conjugate 13-Valent (PCV13) 01/13/2020    Pneumococcal Polysaccharide (PPSV23) 03/04/2021    Shingrix 10/27/2021, 11/20/2023    Tdap 01/01/2013, 03/07/2024    Zostavax 03/16/2015       Objective   Vitals:    03/22/24 1143   Weight: 87.4 kg (192 lb 9.6 oz)   Height: 180.3 cm (70.98\")     Body mass index is 26.87 kg/m².  Physical Exam  Musculoskeletal:      Comments: Right calf non tender. No swelling. 2+ dp/pt on the right    Negative homans   Neurological:      Mental Status: He is alert and oriented to person, place, and time.   Psychiatric:         Mood and Affect: Mood normal.         Procedures    Assessment & Plan   Diagnoses and all orders for this visit:    1. Right calf pain (Primary)  -     Duplex Venous Lower Extremity - Right CAR; Future    2. Type 2 diabetes mellitus without complication, without long-term current use of insulin  Comments:  Post prandial sugars are getting better. Still up a little in the am. Hold on meds til next a1c                 Stat doppler today. If negative " then he will call Dr. Renteria b/c he have torn a calf muscle.  No follow-ups on file.

## 2024-04-02 DIAGNOSIS — I10 ESSENTIAL HYPERTENSION: Primary | ICD-10-CM

## 2024-04-02 RX ORDER — AMLODIPINE BESYLATE 5 MG/1
TABLET ORAL
Qty: 60 TABLET | Refills: 2 | Status: SHIPPED | OUTPATIENT
Start: 2024-04-02

## 2024-04-10 DIAGNOSIS — R09.89 THROAT CLEARING: ICD-10-CM

## 2024-04-10 RX ORDER — ESOMEPRAZOLE MAGNESIUM 40 MG/1
40 CAPSULE, DELAYED RELEASE ORAL DAILY
Qty: 30 CAPSULE | Refills: 0 | Status: SHIPPED | OUTPATIENT
Start: 2024-04-10

## 2024-05-02 DIAGNOSIS — I10 ESSENTIAL HYPERTENSION: Chronic | ICD-10-CM

## 2024-05-02 RX ORDER — HYDROCHLOROTHIAZIDE 25 MG/1
25 TABLET ORAL DAILY
Qty: 30 TABLET | Refills: 3 | Status: SHIPPED | OUTPATIENT
Start: 2024-05-02

## 2024-05-03 DIAGNOSIS — G47.09 OTHER INSOMNIA: ICD-10-CM

## 2024-05-06 DIAGNOSIS — R09.89 THROAT CLEARING: ICD-10-CM

## 2024-05-06 RX ORDER — ZOLPIDEM TARTRATE 5 MG/1
5 TABLET ORAL NIGHTLY PRN
Qty: 30 TABLET | OUTPATIENT
Start: 2024-05-06

## 2024-05-06 RX ORDER — ESOMEPRAZOLE MAGNESIUM 40 MG/1
40 CAPSULE, DELAYED RELEASE ORAL DAILY
Qty: 30 CAPSULE | Refills: 1 | Status: SHIPPED | OUTPATIENT
Start: 2024-05-06 | End: 2024-05-08 | Stop reason: SDUPTHER

## 2024-05-08 DIAGNOSIS — G47.09 OTHER INSOMNIA: ICD-10-CM

## 2024-05-08 DIAGNOSIS — R09.89 THROAT CLEARING: ICD-10-CM

## 2024-05-09 RX ORDER — ESOMEPRAZOLE MAGNESIUM 40 MG/1
40 CAPSULE, DELAYED RELEASE ORAL DAILY
Qty: 30 CAPSULE | Refills: 1 | Status: SHIPPED | OUTPATIENT
Start: 2024-05-09

## 2024-05-09 RX ORDER — ZOLPIDEM TARTRATE 5 MG/1
5 TABLET ORAL NIGHTLY PRN
Qty: 30 TABLET | Refills: 3 | Status: SHIPPED | OUTPATIENT
Start: 2024-05-09

## 2024-06-03 ENCOUNTER — TELEPHONE (OUTPATIENT)
Dept: INTERNAL MEDICINE | Facility: CLINIC | Age: 69
End: 2024-06-03
Payer: COMMERCIAL

## 2024-06-03 DIAGNOSIS — G47.09 OTHER INSOMNIA: ICD-10-CM

## 2024-06-03 RX ORDER — ZOLPIDEM TARTRATE 5 MG/1
5 TABLET ORAL NIGHTLY PRN
Qty: 30 TABLET | Refills: 3 | OUTPATIENT
Start: 2024-06-03

## 2024-07-02 DIAGNOSIS — I10 ESSENTIAL HYPERTENSION: ICD-10-CM

## 2024-07-02 RX ORDER — AMLODIPINE BESYLATE 5 MG/1
TABLET ORAL
Qty: 60 TABLET | Refills: 2 | Status: SHIPPED | OUTPATIENT
Start: 2024-07-02

## 2024-07-03 ENCOUNTER — LAB (OUTPATIENT)
Facility: HOSPITAL | Age: 69
End: 2024-07-03
Payer: COMMERCIAL

## 2024-07-03 PROCEDURE — 82570 ASSAY OF URINE CREATININE: CPT | Performed by: INTERNAL MEDICINE

## 2024-07-03 PROCEDURE — 80053 COMPREHEN METABOLIC PANEL: CPT | Performed by: INTERNAL MEDICINE

## 2024-07-03 PROCEDURE — 80061 LIPID PANEL: CPT | Performed by: INTERNAL MEDICINE

## 2024-07-03 PROCEDURE — 82043 UR ALBUMIN QUANTITATIVE: CPT | Performed by: INTERNAL MEDICINE

## 2024-07-03 PROCEDURE — 83036 HEMOGLOBIN GLYCOSYLATED A1C: CPT | Performed by: INTERNAL MEDICINE

## 2024-07-04 ENCOUNTER — TELEPHONE (OUTPATIENT)
Dept: INTERNAL MEDICINE | Facility: CLINIC | Age: 69
End: 2024-07-04
Payer: COMMERCIAL

## 2024-07-13 DIAGNOSIS — R09.89 THROAT CLEARING: ICD-10-CM

## 2024-07-15 RX ORDER — ESOMEPRAZOLE MAGNESIUM 40 MG/1
40 CAPSULE, DELAYED RELEASE ORAL DAILY
Qty: 30 CAPSULE | Refills: 0 | Status: SHIPPED | OUTPATIENT
Start: 2024-07-15

## 2024-07-30 ENCOUNTER — OFFICE VISIT (OUTPATIENT)
Dept: INTERNAL MEDICINE | Facility: CLINIC | Age: 69
End: 2024-07-30
Payer: COMMERCIAL

## 2024-07-30 VITALS
SYSTOLIC BLOOD PRESSURE: 120 MMHG | HEIGHT: 71 IN | DIASTOLIC BLOOD PRESSURE: 72 MMHG | WEIGHT: 188.2 LBS | BODY MASS INDEX: 26.35 KG/M2

## 2024-07-30 DIAGNOSIS — E11.9 TYPE 2 DIABETES MELLITUS WITHOUT COMPLICATION, WITHOUT LONG-TERM CURRENT USE OF INSULIN: Chronic | ICD-10-CM

## 2024-07-30 DIAGNOSIS — I10 ESSENTIAL HYPERTENSION: Primary | Chronic | ICD-10-CM

## 2024-07-30 DIAGNOSIS — E78.00 HIGH CHOLESTEROL: Chronic | ICD-10-CM

## 2024-07-30 DIAGNOSIS — Z12.5 PROSTATE CANCER SCREENING: ICD-10-CM

## 2024-07-30 PROCEDURE — 99214 OFFICE O/P EST MOD 30 MIN: CPT | Performed by: INTERNAL MEDICINE

## 2024-07-30 NOTE — PROGRESS NOTES
Subjective        Chief Complaint   Patient presents with    Hypertension           Neil Sanders is a 68 y.o. male who presents for    Patient Active Problem List   Diagnosis    Essential hypertension    Other insomnia    High cholesterol    Type 2 diabetes mellitus without complication, without long-term current use of insulin       History of Present Illness     His am sugars run 117-143. His BP has been 135/85. Denies dyspnea or chest pain. He took Crestor in the past and he may have had muscle aches with it.    No Known Allergies    Current Outpatient Medications on File Prior to Visit   Medication Sig Dispense Refill    amLODIPine (NORVASC) 5 MG tablet TAKE 1 TABLET BY MOUTH TWICE A DAY 60 tablet 1    esomeprazole (nexIUM) 40 MG capsule TAKE 1 CAPSULE BY MOUTH DAILY 30 capsule 0    hydroCHLOROthiazide 25 MG tablet TAKE 1 TABLET BY MOUTH DAILY 30 tablet 3    sildenafil (Viagra) 100 MG tablet Take 1/2 tablet QD PRN 30 tablet 0    valsartan (Diovan) 80 MG tablet Take 1 tablet by mouth Daily. 90 tablet 1    zolpidem (AMBIEN) 5 MG tablet Take 1 tablet by mouth At Night As Needed for Sleep. 30 tablet 3     No current facility-administered medications on file prior to visit.       Past Medical History:   Diagnosis Date    Allergic rhinitis     COVID-19 06/2022    COVID-19 04/2023    Erectile dysfunction     Inability to attain erection        Past Surgical History:   Procedure Laterality Date    CATARACT EXTRACTION  2023    COLONOSCOPY  12/17/2013    repeat 10 years Dr. Valdivia    COLONOSCOPY  05/15/2024    repeat in 10 years    HEMORRHOIDECTOMY      UMBILICAL HERNIA REPAIR         Family History   Problem Relation Age of Onset    Other Mother         cerebral aneurysm    Cancer Father         throat       Social History     Socioeconomic History    Marital status:    Tobacco Use    Smoking status: Never    Smokeless tobacco: Never   Vaping Use    Vaping status: Never Used   Substance and Sexual Activity     "Alcohol use: Yes     Comment: 1-2 ounces of alcohol per day    Drug use: Never    Sexual activity: Never           The following portions of the patient's history were reviewed and updated as appropriate: problem list, allergies, current medications, past medical history, past family history, past social history, and past surgical history.    Review of Systems    Immunization History   Administered Date(s) Administered    COVID-19 (PFIZER) BIVALENT 12+YRS 09/26/2022    COVID-19 (PFIZER) Purple Cap Monovalent 01/28/2021, 02/17/2021, 10/05/2021    Covid-19 (Pfizer) Gray Cap Monovalent 04/09/2022    DTaP 01/01/2013    Flu Vaccine Split Quad 10/15/2018    Fluad Quad 65+ 10/27/2021    Fluzone High Dose =>65 Years ([employer] ONLY) 01/03/2020, 08/23/2020, 10/11/2022    Influenza, Unspecified 11/19/2023    Pneumococcal Conjugate 13-Valent (PCV13) 01/13/2020    Pneumococcal Polysaccharide (PPSV23) 03/04/2021    Shingrix 10/27/2021, 11/20/2023    Tdap 01/01/2013, 03/07/2024    Zostavax 03/16/2015       Objective   Vitals:    07/30/24 1704   BP: 120/72   Weight: 85.4 kg (188 lb 3.2 oz)   Height: 180.3 cm (70.98\")     Body mass index is 26.26 kg/m².  Physical Exam  Vitals reviewed.   Constitutional:       Appearance: He is well-developed.   HENT:      Head: Normocephalic and atraumatic.   Cardiovascular:      Rate and Rhythm: Normal rate and regular rhythm.      Heart sounds: Normal heart sounds, S1 normal and S2 normal.   Pulmonary:      Effort: Pulmonary effort is normal.      Breath sounds: Normal breath sounds.   Skin:     General: Skin is warm.   Neurological:      Mental Status: He is alert.   Psychiatric:         Behavior: Behavior normal.         Procedures    Assessment & Plan   Diagnoses and all orders for this visit:    1. Essential hypertension (Primary)  -     Comprehensive Metabolic Panel; Future    2. High cholesterol  -     Lipid Panel With / Chol / HDL Ratio; Future  -     atorvastatin (LIPITOR) 20 MG tablet; " Take 1 tablet by mouth Daily.  Dispense: 90 tablet; Refill: 1    3. Type 2 diabetes mellitus without complication, without long-term current use of insulin  -     Hemoglobin A1c; Future    4. Prostate cancer screening  -     PSA Screen; Future                 Reviewed cmp, flp, A1c and urine micro. [facility] eye exam. Add lipitor for chol. He will start co q 10 with the lipitor. BP and sugars are at goal.  Return in about 2 months (around 9/30/2024) for Annual physical, Lab Before FUP.

## 2024-07-31 RX ORDER — ATORVASTATIN CALCIUM 20 MG/1
20 TABLET, FILM COATED ORAL DAILY
Qty: 90 TABLET | Refills: 1 | Status: SHIPPED | OUTPATIENT
Start: 2024-07-31

## 2024-08-14 DIAGNOSIS — R09.89 THROAT CLEARING: ICD-10-CM

## 2024-08-14 RX ORDER — ESOMEPRAZOLE MAGNESIUM 40 MG/1
40 CAPSULE, DELAYED RELEASE ORAL DAILY
Qty: 30 CAPSULE | Refills: 0 | Status: SHIPPED | OUTPATIENT
Start: 2024-08-14

## 2024-09-05 DIAGNOSIS — I10 ESSENTIAL HYPERTENSION: Chronic | ICD-10-CM

## 2024-09-05 RX ORDER — HYDROCHLOROTHIAZIDE 25 MG/1
25 TABLET ORAL DAILY
Qty: 30 TABLET | Refills: 0 | Status: SHIPPED | OUTPATIENT
Start: 2024-09-05

## 2024-09-22 DIAGNOSIS — G47.09 OTHER INSOMNIA: ICD-10-CM

## 2024-09-23 RX ORDER — ZOLPIDEM TARTRATE 5 MG/1
5 TABLET ORAL NIGHTLY PRN
Qty: 30 TABLET | OUTPATIENT
Start: 2024-09-23

## 2024-09-27 ENCOUNTER — LAB (OUTPATIENT)
Dept: LAB | Facility: HOSPITAL | Age: 69
End: 2024-09-27
Payer: MEDICAID

## 2024-09-27 DIAGNOSIS — G47.09 OTHER INSOMNIA: ICD-10-CM

## 2024-09-27 PROCEDURE — 80053 COMPREHEN METABOLIC PANEL: CPT | Performed by: INTERNAL MEDICINE

## 2024-09-27 PROCEDURE — 83036 HEMOGLOBIN GLYCOSYLATED A1C: CPT | Performed by: INTERNAL MEDICINE

## 2024-09-27 PROCEDURE — G0103 PSA SCREENING: HCPCS | Performed by: INTERNAL MEDICINE

## 2024-09-27 PROCEDURE — 80061 LIPID PANEL: CPT | Performed by: INTERNAL MEDICINE

## 2024-09-27 RX ORDER — ZOLPIDEM TARTRATE 5 MG/1
5 TABLET ORAL NIGHTLY PRN
Qty: 30 TABLET | Refills: 3 | Status: SHIPPED | OUTPATIENT
Start: 2024-09-27

## 2024-09-29 DIAGNOSIS — I10 ESSENTIAL HYPERTENSION: Chronic | ICD-10-CM

## 2024-09-30 ENCOUNTER — OFFICE VISIT (OUTPATIENT)
Dept: INTERNAL MEDICINE | Facility: CLINIC | Age: 69
End: 2024-09-30
Payer: COMMERCIAL

## 2024-09-30 VITALS
HEIGHT: 71 IN | WEIGHT: 189.2 LBS | BODY MASS INDEX: 26.49 KG/M2 | DIASTOLIC BLOOD PRESSURE: 82 MMHG | SYSTOLIC BLOOD PRESSURE: 128 MMHG

## 2024-09-30 DIAGNOSIS — Z23 NEED FOR INFLUENZA VACCINATION: ICD-10-CM

## 2024-09-30 DIAGNOSIS — E11.9 TYPE 2 DIABETES MELLITUS WITHOUT COMPLICATION, WITHOUT LONG-TERM CURRENT USE OF INSULIN: Chronic | ICD-10-CM

## 2024-09-30 DIAGNOSIS — I10 ESSENTIAL HYPERTENSION: Chronic | ICD-10-CM

## 2024-09-30 DIAGNOSIS — Z00.00 WELLNESS EXAMINATION: Primary | ICD-10-CM

## 2024-09-30 DIAGNOSIS — E78.00 HIGH CHOLESTEROL: Chronic | ICD-10-CM

## 2024-09-30 DIAGNOSIS — R97.20 PSA ELEVATION: ICD-10-CM

## 2024-09-30 PROCEDURE — 90662 IIV NO PRSV INCREASED AG IM: CPT | Performed by: INTERNAL MEDICINE

## 2024-09-30 PROCEDURE — 99397 PER PM REEVAL EST PAT 65+ YR: CPT | Performed by: INTERNAL MEDICINE

## 2024-09-30 PROCEDURE — 90471 IMMUNIZATION ADMIN: CPT | Performed by: INTERNAL MEDICINE

## 2024-09-30 RX ORDER — VALSARTAN 80 MG/1
80 TABLET ORAL DAILY
Qty: 90 TABLET | Refills: 1 | Status: SHIPPED | OUTPATIENT
Start: 2024-09-30

## 2024-09-30 NOTE — PROGRESS NOTES
Subjective        Chief Complaint   Patient presents with    Annual Exam           Neil Sanders is a 69 y.o. male who presents for    Patient Active Problem List   Diagnosis    Essential hypertension    Other insomnia    High cholesterol    Type 2 diabetes mellitus without complication, without long-term current use of insulin       History of Present Illness       His BP runs 130/80 at home. His blood sugars run 130-145 in the am. During the day his sugars run 110s.  Denies reflux, dysuria, hematuria, chest pain, dyspnea, nausea or blood in his stools. He plays tennis and golf. He gets up once per night to urinate.   No Known Allergies    Current Outpatient Medications on File Prior to Visit   Medication Sig Dispense Refill    amLODIPine (NORVASC) 5 MG tablet TAKE 1 TABLET BY MOUTH TWICE A DAY 60 tablet 1    atorvastatin (LIPITOR) 20 MG tablet Take 1 tablet by mouth Daily. 90 tablet 1    esomeprazole (nexIUM) 40 MG capsule TAKE 1 CAPSULE BY MOUTH DAILY 30 capsule 0    hydroCHLOROthiazide 25 MG tablet TAKE 1 TABLET BY MOUTH DAILY 30 tablet 0    sildenafil (Viagra) 100 MG tablet Take 1/2 tablet QD PRN 30 tablet 0    valsartan (DIOVAN) 80 MG tablet TAKE 1 TABLET BY MOUTH DAILY 90 tablet 1    zolpidem (AMBIEN) 5 MG tablet Take 1 tablet by mouth At Night As Needed for Sleep. 30 tablet 3    [DISCONTINUED] valsartan (Diovan) 80 MG tablet Take 1 tablet by mouth Daily. 90 tablet 1     No current facility-administered medications on file prior to visit.       Past Medical History:   Diagnosis Date    Allergic rhinitis     COVID-19 06/2022    COVID-19 04/2023    Erectile dysfunction     Inability to attain erection        Past Surgical History:   Procedure Laterality Date    CATARACT EXTRACTION  2023    COLONOSCOPY  12/17/2013    repeat 10 years Dr. Valdivia    COLONOSCOPY  05/15/2024    repeat in 10 years    HEMORRHOIDECTOMY      UMBILICAL HERNIA REPAIR         Family History   Problem Relation Age of Onset    Cerebral  "aneurysm Mother     Cancer Father         throat       Social History     Socioeconomic History    Marital status:    Tobacco Use    Smoking status: Never    Smokeless tobacco: Never   Vaping Use    Vaping status: Never Used   Substance and Sexual Activity    Alcohol use: Yes     Comment: 1-2 ounces of alcohol per day    Drug use: Never    Sexual activity: Never           The following portions of the patient's history were reviewed and updated as appropriate: problem list, allergies, current medications, past medical history, past family history, past social history, and past surgical history.    Review of Systems    Immunization History   Administered Date(s) Administered    COVID-19 (PFIZER) BIVALENT 12+YRS 09/26/2022    COVID-19 (PFIZER) Purple Cap Monovalent 01/28/2021, 02/17/2021, 10/05/2021    Covid-19 (Pfizer) Gray Cap Monovalent 04/09/2022    DTaP 01/01/2013    Flu Vaccine Split Quad 10/15/2018    Fluad Quad 65+ 10/27/2021    Fluzone High-Dose 65+YRS 01/03/2020, 08/23/2020, 10/11/2022, 09/30/2024    Influenza, Unspecified 11/19/2023    Pneumococcal Conjugate 13-Valent (PCV13) 01/13/2020    Pneumococcal Polysaccharide (PPSV23) 03/04/2021    Shingrix 10/27/2021, 11/20/2023    Tdap 01/01/2013, 03/07/2024    Zostavax 03/16/2015       Objective   Vitals:    09/30/24 0759   BP: 128/82   Weight: 85.8 kg (189 lb 3.2 oz)   Height: 180.3 cm (70.98\")     Body mass index is 26.4 kg/m².  Physical Exam  Vitals reviewed.   Constitutional:       Appearance: He is well-developed.   HENT:      Head: Normocephalic and atraumatic.      Mouth/Throat:      Mouth: Mucous membranes are moist.      Pharynx: Oropharynx is clear.   Eyes:      Extraocular Movements: Extraocular movements intact.      Conjunctiva/sclera: Conjunctivae normal.      Pupils: Pupils are equal, round, and reactive to light.   Neck:      Thyroid: No thyromegaly.      Vascular: No carotid bruit.   Cardiovascular:      Rate and Rhythm: Normal rate and " regular rhythm.      Heart sounds: Normal heart sounds. No murmur heard.  Pulmonary:      Effort: Pulmonary effort is normal.      Breath sounds: Normal breath sounds.   Abdominal:      General: There is no distension.      Palpations: Abdomen is soft. There is no mass.      Tenderness: There is no abdominal tenderness. There is no rebound.   Genitourinary:     Comments: Declines rectal exam  Musculoskeletal:      Cervical back: Neck supple.   Feet:      Right foot:      Protective Sensation: 5 sites tested.  4 sites sensed.      Skin integrity: Skin integrity normal.      Left foot:      Protective Sensation: 5 sites tested.  5 sites sensed.      Skin integrity: Skin integrity normal.      Comments: Distal right second toenail with thickening and splintering.  Lymphadenopathy:      Cervical: No cervical adenopathy.   Skin:     General: Skin is warm.   Neurological:      Mental Status: He is alert.   Psychiatric:         Behavior: Behavior normal.         Procedures    Assessment & Plan   Diagnoses and all orders for this visit:    1. Wellness examination (Primary)    2. Type 2 diabetes mellitus without complication, without long-term current use of insulin  Comments:  a1c at goal  Orders:  -     Hemoglobin A1c; Future    3. Essential hypertension  Comments:  BP is good  Orders:  -     Comprehensive Metabolic Panel; Future    4. High cholesterol  Comments:  Discussed increasing lipitor to get LDL below 70. He says he was traveling last week and forgot meds.  Orders:  -     Lipid Panel With / Chol / HDL Ratio; Future    5. PSA elevation  -     Ambulatory Referral to Urology    6. Need for influenza vaccination  -     Fluzone High-Dose 65+yrs               Reviewed labs including cmp, flp, psa and A1c. Recc exercising 150 minutes per week. Cscope is UTD. Recc eye exam. Flu shot today. Get into urology for increased PSA.    Return in about 3 months (around 12/30/2024) for Lab Before FUP.

## 2024-10-01 DIAGNOSIS — I10 ESSENTIAL HYPERTENSION: Chronic | ICD-10-CM

## 2024-10-01 RX ORDER — HYDROCHLOROTHIAZIDE 25 MG/1
25 TABLET ORAL DAILY
Qty: 30 TABLET | Refills: 0 | OUTPATIENT
Start: 2024-10-01

## 2024-10-03 DIAGNOSIS — I10 ESSENTIAL HYPERTENSION: Chronic | ICD-10-CM

## 2024-10-03 RX ORDER — HYDROCHLOROTHIAZIDE 25 MG/1
25 TABLET ORAL DAILY
Qty: 30 TABLET | Refills: 0 | OUTPATIENT
Start: 2024-10-03

## 2024-10-21 RX ORDER — AMLODIPINE BESYLATE 5 MG/1
5 TABLET ORAL 2 TIMES DAILY
Qty: 60 TABLET | Refills: 1 | Status: SHIPPED | OUTPATIENT
Start: 2024-10-21

## 2024-10-27 DIAGNOSIS — G47.09 OTHER INSOMNIA: ICD-10-CM

## 2024-10-28 DIAGNOSIS — G47.09 OTHER INSOMNIA: ICD-10-CM

## 2024-10-28 DIAGNOSIS — I10 ESSENTIAL HYPERTENSION: Chronic | ICD-10-CM

## 2024-10-28 RX ORDER — ZOLPIDEM TARTRATE 5 MG/1
5 TABLET ORAL NIGHTLY PRN
Qty: 30 TABLET | Refills: 3 | OUTPATIENT
Start: 2024-10-28

## 2024-10-28 RX ORDER — ZOLPIDEM TARTRATE 5 MG/1
5 TABLET ORAL NIGHTLY PRN
Qty: 30 TABLET | Refills: 3 | Status: SHIPPED | OUTPATIENT
Start: 2024-10-28

## 2024-10-28 RX ORDER — HYDROCHLOROTHIAZIDE 25 MG/1
25 TABLET ORAL DAILY
Qty: 30 TABLET | Refills: 2 | Status: SHIPPED | OUTPATIENT
Start: 2024-10-28

## 2024-11-29 DIAGNOSIS — N52.9 ERECTILE DYSFUNCTION, UNSPECIFIED ERECTILE DYSFUNCTION TYPE: ICD-10-CM

## 2024-12-02 RX ORDER — SILDENAFIL 100 MG/1
TABLET, FILM COATED ORAL
Qty: 30 TABLET | Refills: 0 | Status: SHIPPED | OUTPATIENT
Start: 2024-12-02

## 2025-01-13 ENCOUNTER — LAB (OUTPATIENT)
Facility: HOSPITAL | Age: 70
End: 2025-01-13
Payer: COMMERCIAL

## 2025-01-13 PROCEDURE — 80061 LIPID PANEL: CPT | Performed by: INTERNAL MEDICINE

## 2025-01-13 PROCEDURE — 83036 HEMOGLOBIN GLYCOSYLATED A1C: CPT | Performed by: INTERNAL MEDICINE

## 2025-01-13 PROCEDURE — 80053 COMPREHEN METABOLIC PANEL: CPT | Performed by: INTERNAL MEDICINE

## 2025-01-14 ENCOUNTER — OFFICE VISIT (OUTPATIENT)
Dept: INTERNAL MEDICINE | Facility: CLINIC | Age: 70
End: 2025-01-14
Payer: COMMERCIAL

## 2025-01-14 VITALS
SYSTOLIC BLOOD PRESSURE: 120 MMHG | TEMPERATURE: 97.8 F | BODY MASS INDEX: 26.88 KG/M2 | WEIGHT: 192 LBS | DIASTOLIC BLOOD PRESSURE: 80 MMHG | HEIGHT: 71 IN

## 2025-01-14 DIAGNOSIS — I10 ESSENTIAL HYPERTENSION: Primary | Chronic | ICD-10-CM

## 2025-01-14 DIAGNOSIS — H92.02 LEFT EAR PAIN: ICD-10-CM

## 2025-01-14 DIAGNOSIS — E87.6 HYPOKALEMIA: ICD-10-CM

## 2025-01-14 DIAGNOSIS — R09.89 THROAT CLEARING: ICD-10-CM

## 2025-01-14 DIAGNOSIS — E11.9 TYPE 2 DIABETES MELLITUS WITHOUT COMPLICATION, WITHOUT LONG-TERM CURRENT USE OF INSULIN: Chronic | ICD-10-CM

## 2025-01-14 DIAGNOSIS — E78.00 HIGH CHOLESTEROL: Chronic | ICD-10-CM

## 2025-01-14 PROCEDURE — 99214 OFFICE O/P EST MOD 30 MIN: CPT | Performed by: INTERNAL MEDICINE

## 2025-01-14 RX ORDER — POTASSIUM CHLORIDE 1500 MG/1
20 TABLET, EXTENDED RELEASE ORAL 2 TIMES DAILY
Qty: 90 TABLET | Refills: 1 | Status: SHIPPED | OUTPATIENT
Start: 2025-01-14

## 2025-01-14 RX ORDER — ATORVASTATIN CALCIUM 40 MG/1
40 TABLET, FILM COATED ORAL DAILY
Qty: 90 TABLET | Refills: 1 | Status: SHIPPED | OUTPATIENT
Start: 2025-01-14

## 2025-01-14 NOTE — PROGRESS NOTES
Subjective        Chief Complaint   Patient presents with    Diabetes           Neil Sanders is a 69 y.o. male who presents for    Patient Active Problem List   Diagnosis    Essential hypertension    Other insomnia    High cholesterol    Type 2 diabetes mellitus without complication, without long-term current use of insulin       History of Present Illness     He saw urology and he had an MRI of his prostate; he reports a small lesion in his prostate. He was told area was fine and to repeat a PSA in July. His BP has been 125/78. He has not been checking his sugars.     He had a sensation of popping his left ear since landing in [place] in the fall. He r/s a nasal and claritin D. He saw ENT at  for throat clearing a year ago. He feels a phlegm in his chest and throat after he eats. He does not feel reflux.    No Known Allergies    Current Outpatient Medications on File Prior to Visit   Medication Sig Dispense Refill    amLODIPine (NORVASC) 5 MG tablet TAKE 1 TABLET BY MOUTH 2 TIMES A DAY 60 tablet 1    esomeprazole (nexIUM) 40 MG capsule TAKE 1 CAPSULE BY MOUTH DAILY 30 capsule 0    hydroCHLOROthiazide 25 MG tablet TAKE 1 TABLET BY MOUTH DAILY 30 tablet 2    sildenafil (Viagra) 100 MG tablet Take 1/2 tablet QD PRN 30 tablet 0    valsartan (DIOVAN) 80 MG tablet TAKE 1 TABLET BY MOUTH DAILY 90 tablet 1    zolpidem (AMBIEN) 5 MG tablet Take 1 tablet by mouth At Night As Needed for Sleep. 30 tablet 3    [DISCONTINUED] atorvastatin (LIPITOR) 20 MG tablet Take 1 tablet by mouth Daily. 90 tablet 1     No current facility-administered medications on file prior to visit.       Past Medical History:   Diagnosis Date    Allergic rhinitis     COVID-19 06/2022    COVID-19 04/2023    Erectile dysfunction     Inability to attain erection        Past Surgical History:   Procedure Laterality Date    CATARACT EXTRACTION  2023    COLONOSCOPY  12/17/2013    repeat 10 years Dr. Valdivia    COLONOSCOPY  05/15/2024    repeat in 10  "years    HEMORRHOIDECTOMY      UMBILICAL HERNIA REPAIR         Family History   Problem Relation Age of Onset    Cerebral aneurysm Mother     Cancer Father         throat       Social History     Socioeconomic History    Marital status:    Tobacco Use    Smoking status: Never    Smokeless tobacco: Never   Vaping Use    Vaping status: Never Used   Substance and Sexual Activity    Alcohol use: Yes     Comment: 1-2 ounces of alcohol per day    Drug use: Never    Sexual activity: Never           The following portions of the patient's history were reviewed and updated as appropriate: problem list, allergies, current medications, past medical history, past family history, past social history, and past surgical history.    Review of Systems    Immunization History   Administered Date(s) Administered    COVID-19 (PFIZER) BIVALENT 12+YRS 09/26/2022    COVID-19 (PFIZER) Purple Cap Monovalent 01/28/2021, 02/17/2021, 10/05/2021    Covid-19 (Pfizer) Gray Cap Monovalent 04/09/2022    DTaP 01/01/2013    Flu Vaccine Split Quad 10/15/2018    Fluad Quad 65+ 10/27/2021    Fluzone High-Dose 65+YRS 01/03/2020, 08/23/2020, 10/11/2022, 09/30/2024    Influenza, Unspecified 11/19/2023    Pneumococcal Conjugate 13-Valent (PCV13) 01/13/2020    Pneumococcal Polysaccharide (PPSV23) 03/04/2021    Shingrix 10/27/2021, 11/20/2023    Tdap 01/01/2013, 03/07/2024    Zostavax 03/16/2015       Objective   Vitals:    01/14/25 0923   BP: 120/80   Temp: 97.8 °F (36.6 °C)   Weight: 87.1 kg (192 lb)   Height: 180.3 cm (70.98\")     Body mass index is 26.79 kg/m².  Physical Exam  Vitals reviewed.   Constitutional:       Appearance: He is well-developed.   HENT:      Head: Normocephalic and atraumatic.      Right Ear: Tympanic membrane and ear canal normal.      Left Ear: Tympanic membrane and ear canal normal.      Mouth/Throat:      Mouth: Mucous membranes are moist.      Pharynx: Oropharynx is clear.   Cardiovascular:      Rate and Rhythm: Normal " rate and regular rhythm.      Heart sounds: Normal heart sounds, S1 normal and S2 normal.   Pulmonary:      Effort: Pulmonary effort is normal.      Breath sounds: Normal breath sounds.   Abdominal:      Palpations: Abdomen is soft. There is no hepatomegaly or splenomegaly.   Skin:     General: Skin is warm.   Neurological:      Mental Status: He is alert.   Psychiatric:         Behavior: Behavior normal.         Procedures    Assessment & Plan   Diagnoses and all orders for this visit:    1. Essential hypertension (Primary)  -     Comprehensive Metabolic Panel; Future    2. Type 2 diabetes mellitus without complication, without long-term current use of insulin  -     Hemoglobin A1c; Future  -     Microalbumin / Creatinine Urine Ratio - Urine, Clean Catch; Future    3. High cholesterol  -     atorvastatin (Lipitor) 40 MG tablet; Take 1 tablet by mouth Daily.  Dispense: 90 tablet; Refill: 1  -     Lipid Panel With / Chol / HDL Ratio; Future    4. Hypokalemia  -     potassium chloride (KLOR-CON M20) 20 MEQ CR tablet; Take 1 tablet by mouth 2 (Two) Times a Day.  Dispense: 90 tablet; Refill: 1  -     Basic Metabolic Panel; Future    5. Left ear pain  Comments:  continue flonase and f/u ENT    6. Throat clearing  Comments:  Recc f/u ENT and GI                 Reviewed cmp, flp, and A1c. BP is good. Add K and recheck in a week. Increase lipitor.  Return in about 3 months (around 4/14/2025), or 30 minutes, for Lab Before FUP.

## 2025-01-27 DIAGNOSIS — E11.9 TYPE 2 DIABETES MELLITUS WITHOUT COMPLICATION, WITHOUT LONG-TERM CURRENT USE OF INSULIN: Primary | ICD-10-CM

## 2025-02-25 RX ORDER — AMLODIPINE BESYLATE 5 MG/1
5 TABLET ORAL 2 TIMES DAILY
Qty: 60 TABLET | Refills: 1 | Status: SHIPPED | OUTPATIENT
Start: 2025-02-25

## 2025-02-27 ENCOUNTER — LAB (OUTPATIENT)
Facility: HOSPITAL | Age: 70
End: 2025-02-27
Payer: COMMERCIAL

## 2025-02-27 PROCEDURE — 80048 BASIC METABOLIC PNL TOTAL CA: CPT | Performed by: INTERNAL MEDICINE

## 2025-03-27 RX ORDER — ATORVASTATIN CALCIUM 20 MG/1
20 TABLET, FILM COATED ORAL DAILY
Qty: 90 TABLET | OUTPATIENT
Start: 2025-03-27

## 2025-04-22 ENCOUNTER — LAB (OUTPATIENT)
Facility: HOSPITAL | Age: 70
End: 2025-04-22
Payer: COMMERCIAL

## 2025-04-22 PROCEDURE — 82043 UR ALBUMIN QUANTITATIVE: CPT | Performed by: INTERNAL MEDICINE

## 2025-04-22 PROCEDURE — 82570 ASSAY OF URINE CREATININE: CPT | Performed by: INTERNAL MEDICINE

## 2025-04-22 PROCEDURE — 83036 HEMOGLOBIN GLYCOSYLATED A1C: CPT | Performed by: INTERNAL MEDICINE

## 2025-04-22 PROCEDURE — 80061 LIPID PANEL: CPT | Performed by: INTERNAL MEDICINE

## 2025-04-22 PROCEDURE — 80053 COMPREHEN METABOLIC PANEL: CPT | Performed by: INTERNAL MEDICINE

## 2025-05-12 RX ORDER — AMLODIPINE BESYLATE 5 MG/1
5 TABLET ORAL 2 TIMES DAILY
Qty: 60 TABLET | Refills: 2 | Status: SHIPPED | OUTPATIENT
Start: 2025-05-12

## 2025-05-14 DIAGNOSIS — G47.09 OTHER INSOMNIA: ICD-10-CM

## 2025-05-14 RX ORDER — ZOLPIDEM TARTRATE 5 MG/1
5 TABLET ORAL NIGHTLY PRN
Qty: 30 TABLET | Refills: 3 | Status: SHIPPED | OUTPATIENT
Start: 2025-05-14

## 2025-05-16 ENCOUNTER — OFFICE VISIT (OUTPATIENT)
Dept: INTERNAL MEDICINE | Facility: CLINIC | Age: 70
End: 2025-05-16
Payer: COMMERCIAL

## 2025-05-16 VITALS
DIASTOLIC BLOOD PRESSURE: 82 MMHG | HEIGHT: 71 IN | WEIGHT: 197.2 LBS | SYSTOLIC BLOOD PRESSURE: 158 MMHG | BODY MASS INDEX: 27.61 KG/M2

## 2025-05-16 DIAGNOSIS — E78.00 HIGH CHOLESTEROL: Chronic | ICD-10-CM

## 2025-05-16 DIAGNOSIS — I10 ESSENTIAL HYPERTENSION: Chronic | ICD-10-CM

## 2025-05-16 DIAGNOSIS — L29.9 PRURITUS: ICD-10-CM

## 2025-05-16 DIAGNOSIS — E11.9 TYPE 2 DIABETES MELLITUS WITHOUT COMPLICATION, WITHOUT LONG-TERM CURRENT USE OF INSULIN: Primary | Chronic | ICD-10-CM

## 2025-05-16 RX ORDER — VALSARTAN 160 MG/1
160 TABLET ORAL DAILY
Qty: 90 TABLET | Refills: 1 | Status: SHIPPED | OUTPATIENT
Start: 2025-05-16

## 2025-05-16 RX ORDER — VALSARTAN 160 MG/1
160 TABLET ORAL DAILY
Qty: 90 TABLET | Refills: 1 | Status: SHIPPED | OUTPATIENT
Start: 2025-05-16 | End: 2025-05-16

## 2025-05-16 NOTE — PROGRESS NOTES
Subjective        Chief Complaint   Patient presents with    Hypertension           Neil Sanders is a 69 y.o. male who presents for    Patient Active Problem List   Diagnosis    Essential hypertension    Other insomnia    High cholesterol    Type 2 diabetes mellitus without complication, without long-term current use of insulin       History of Present Illness       He saw Dr. Hernandez for a spot in front of his left axilla and itching. He was told to start amlactin and zyrtec. He has been itching for 2 years. His sugars in the am are around 142-153. In the pm it runs 110. He stopped the HCTZ two months ago b/c of urination with it. He does not exercise regularly except for playing golf.  No Known Allergies    Current Outpatient Medications on File Prior to Visit   Medication Sig Dispense Refill    amLODIPine (NORVASC) 5 MG tablet TAKE 1 TABLET BY MOUTH TWICE DAILY 60 tablet 2    atorvastatin (Lipitor) 40 MG tablet Take 1 tablet by mouth Daily. 90 tablet 1    esomeprazole (nexIUM) 40 MG capsule TAKE 1 CAPSULE BY MOUTH DAILY 30 capsule 0    glucose blood test strip Test once a day Use as instructed 100 each 2    sildenafil (Viagra) 100 MG tablet Take 1/2 tablet QD PRN 30 tablet 0    zolpidem (AMBIEN) 5 MG tablet Take 1 tablet by mouth At Night As Needed for Sleep. 30 tablet 3    [DISCONTINUED] hydroCHLOROthiazide 25 MG tablet TAKE 1 TABLET BY MOUTH DAILY 30 tablet 2    [DISCONTINUED] valsartan (DIOVAN) 80 MG tablet TAKE 1 TABLET BY MOUTH DAILY 90 tablet 1    [DISCONTINUED] potassium chloride (KLOR-CON M20) 20 MEQ CR tablet Take 1 tablet by mouth 2 (Two) Times a Day. (Patient not taking: Reported on 5/16/2025) 90 tablet 1     No current facility-administered medications on file prior to visit.       Past Medical History:   Diagnosis Date    Allergic rhinitis     COVID-19 06/2022    COVID-19 04/2023    Erectile dysfunction     Inability to attain erection        Past Surgical History:   Procedure Laterality Date     "CATARACT EXTRACTION  2023    COLONOSCOPY  12/17/2013    repeat 10 years Dr. Valdivia    COLONOSCOPY  05/15/2024    repeat in 10 years    HEMORRHOIDECTOMY      UMBILICAL HERNIA REPAIR         Family History   Problem Relation Age of Onset    Cerebral aneurysm Mother     Cancer Father         throat       Social History     Socioeconomic History    Marital status:    Tobacco Use    Smoking status: Never    Smokeless tobacco: Never   Vaping Use    Vaping status: Never Used   Substance and Sexual Activity    Alcohol use: Yes     Comment: 1-2 ounces of alcohol per day    Drug use: Never    Sexual activity: Never           The following portions of the patient's history were reviewed and updated as appropriate: problem list, allergies, current medications, past medical history, past family history, past social history, and past surgical history.    Review of Systems    Immunization History   Administered Date(s) Administered    COVID-19 (PFIZER) BIVALENT 12+YRS 09/26/2022    COVID-19 (PFIZER) Purple Cap Monovalent 01/28/2021, 02/17/2021, 10/05/2021    Covid-19 (Pfizer) Gray Cap Monovalent 04/09/2022    DTaP 01/01/2013    Flu Vaccine Split Quad 10/15/2018    Fluad Quad 65+ 10/27/2021    Fluzone High-Dose 65+YRS 01/03/2020, 08/23/2020, 10/11/2022, 09/30/2024    Influenza, Unspecified 11/19/2023    Pneumococcal Conjugate 13-Valent (PCV13) 01/13/2020    Pneumococcal Polysaccharide (PPSV23) 03/04/2021    Shingrix 10/27/2021, 11/20/2023    Tdap 01/01/2013, 03/07/2024    Zostavax 03/16/2015       Objective   Vitals:    05/16/25 0802   BP: 158/82   Weight: 89.4 kg (197 lb 3.2 oz)   Height: 180.3 cm (70.98\")     Body mass index is 27.52 kg/m².  Physical Exam  Vitals reviewed.   Constitutional:       Appearance: He is well-developed.   HENT:      Head: Normocephalic and atraumatic.   Cardiovascular:      Rate and Rhythm: Normal rate and regular rhythm.      Heart sounds: Normal heart sounds, S1 normal and S2 normal. "   Pulmonary:      Effort: Pulmonary effort is normal.      Breath sounds: Normal breath sounds.   Skin:     General: Skin is warm.   Neurological:      Mental Status: He is alert.   Psychiatric:         Behavior: Behavior normal.         Procedures    Assessment & Plan   Diagnoses and all orders for this visit:    1. Type 2 diabetes mellitus without complication, without long-term current use of insulin (Primary)  -     Basic Metabolic Panel; Future    2. Essential hypertension  -     valsartan (Diovan) 160 MG tablet; Take 1 tablet by mouth Daily.  Dispense: 90 tablet; Refill: 1    3. High cholesterol  Comments:  LDL at goal    4. Pruritus  -     CBC & Differential; Future  -     TSH; Future      Reviewed cmp, flp and A1c. He stopped HCTZ b/c of urination with it so I will increase his valsartan. He will start checking BP at home.             Return in about 6 weeks (around 6/27/2025) for Lab Before FUP.

## 2025-06-09 RX ORDER — LANCETS
EACH MISCELLANEOUS
Qty: 100 EACH | Refills: 1 | Status: SHIPPED | OUTPATIENT
Start: 2025-06-09

## 2025-06-23 DIAGNOSIS — N52.9 ERECTILE DYSFUNCTION, UNSPECIFIED ERECTILE DYSFUNCTION TYPE: ICD-10-CM

## 2025-06-24 RX ORDER — SILDENAFIL 100 MG/1
TABLET, FILM COATED ORAL
Qty: 30 TABLET | Refills: 0 | Status: SHIPPED | OUTPATIENT
Start: 2025-06-24

## 2025-07-15 ENCOUNTER — LAB (OUTPATIENT)
Facility: HOSPITAL | Age: 70
End: 2025-07-15
Payer: COMMERCIAL

## 2025-07-15 PROCEDURE — 80048 BASIC METABOLIC PNL TOTAL CA: CPT | Performed by: INTERNAL MEDICINE

## 2025-07-15 PROCEDURE — 84443 ASSAY THYROID STIM HORMONE: CPT | Performed by: INTERNAL MEDICINE

## 2025-07-15 PROCEDURE — 85025 COMPLETE CBC W/AUTO DIFF WBC: CPT | Performed by: INTERNAL MEDICINE

## 2025-07-21 ENCOUNTER — OFFICE VISIT (OUTPATIENT)
Dept: INTERNAL MEDICINE | Facility: CLINIC | Age: 70
End: 2025-07-21
Payer: COMMERCIAL

## 2025-07-21 VITALS
SYSTOLIC BLOOD PRESSURE: 128 MMHG | WEIGHT: 196.8 LBS | BODY MASS INDEX: 27.55 KG/M2 | HEIGHT: 71 IN | DIASTOLIC BLOOD PRESSURE: 74 MMHG

## 2025-07-21 DIAGNOSIS — E11.9 TYPE 2 DIABETES MELLITUS WITHOUT COMPLICATION, WITHOUT LONG-TERM CURRENT USE OF INSULIN: Chronic | ICD-10-CM

## 2025-07-21 DIAGNOSIS — R79.89 TSH ELEVATION: ICD-10-CM

## 2025-07-21 DIAGNOSIS — I10 ESSENTIAL HYPERTENSION: Primary | Chronic | ICD-10-CM

## 2025-07-21 PROCEDURE — 99214 OFFICE O/P EST MOD 30 MIN: CPT | Performed by: INTERNAL MEDICINE

## 2025-07-21 NOTE — PROGRESS NOTES
Subjective        Chief Complaint   Patient presents with    Hypertension           Neil Sanders is a 69 y.o. male who presents for    Patient Active Problem List   Diagnosis    Essential hypertension    Other insomnia    High cholesterol    Type 2 diabetes mellitus without complication, without long-term current use of insulin       History of Present Illness       His BP has been 128/80. Denies constipation, chest pain or dyspnea. His itching has resolved. He has gained 6 pounds over 6 months with eating out and traveling.   No Known Allergies    Current Outpatient Medications on File Prior to Visit   Medication Sig Dispense Refill    Accu-Chek Softclix Lancets lancets USE ONE LANCET EVERY  each 1    amLODIPine (NORVASC) 5 MG tablet TAKE 1 TABLET BY MOUTH TWICE DAILY 60 tablet 2    atorvastatin (Lipitor) 40 MG tablet Take 1 tablet by mouth Daily. 90 tablet 1    esomeprazole (nexIUM) 40 MG capsule TAKE 1 CAPSULE BY MOUTH DAILY 30 capsule 0    glucose blood test strip Test once a day Use as instructed 100 each 2    sildenafil (Viagra) 100 MG tablet Take 1/2 tablet QD PRN 30 tablet 0    valsartan (Diovan) 160 MG tablet Take 1 tablet by mouth Daily. 90 tablet 1    zolpidem (AMBIEN) 5 MG tablet Take 1 tablet by mouth At Night As Needed for Sleep. 30 tablet 3     No current facility-administered medications on file prior to visit.       Past Medical History:   Diagnosis Date    Allergic rhinitis     COVID-19 06/2022    COVID-19 04/2023    Erectile dysfunction     Inability to attain erection        Past Surgical History:   Procedure Laterality Date    CATARACT EXTRACTION  2023    COLONOSCOPY  12/17/2013    repeat 10 years Dr. Valdivia    COLONOSCOPY  05/15/2024    repeat in 10 years    HEMORRHOIDECTOMY      UMBILICAL HERNIA REPAIR         Family History   Problem Relation Age of Onset    Cerebral aneurysm Mother     Cancer Father         throat       Social History     Socioeconomic History    Marital status:  "   Tobacco Use    Smoking status: Never    Smokeless tobacco: Never   Vaping Use    Vaping status: Never Used   Substance and Sexual Activity    Alcohol use: Yes     Comment: 1-2 ounces of alcohol per day    Drug use: Never    Sexual activity: Never           The following portions of the patient's history were reviewed and updated as appropriate: problem list, allergies, current medications, past medical history, past family history, past social history, and past surgical history.    Review of Systems    Immunization History   Administered Date(s) Administered    COVID-19 (PFIZER) BIVALENT 12+YRS 09/26/2022    COVID-19 (PFIZER) Purple Cap Monovalent 01/28/2021, 02/17/2021, 10/05/2021    Covid-19 (Pfizer) Gray Cap Monovalent 04/09/2022    DTaP 01/01/2013    Flu Vaccine Split Quad 10/15/2018    Fluad Quad 65+ 10/27/2021    Fluzone High-Dose 65+YRS 01/03/2020, 08/23/2020, 10/11/2022, 09/30/2024    Influenza, Unspecified 11/19/2023    Pneumococcal Conjugate 13-Valent (PCV13) 01/13/2020    Pneumococcal Polysaccharide (PPSV23) 03/04/2021    Shingrix 10/27/2021, 11/20/2023    Tdap 01/01/2013, 03/07/2024    Zostavax 03/16/2015       Objective   Vitals:    07/21/25 1427   BP: 128/74   Weight: 89.3 kg (196 lb 12.8 oz)   Height: 180.3 cm (70.98\")     Body mass index is 27.46 kg/m².  Physical Exam  Vitals reviewed.   Constitutional:       Appearance: He is well-developed.   HENT:      Head: Normocephalic and atraumatic.   Cardiovascular:      Rate and Rhythm: Normal rate and regular rhythm.      Heart sounds: Normal heart sounds, S1 normal and S2 normal.   Pulmonary:      Effort: Pulmonary effort is normal.      Breath sounds: Normal breath sounds.   Skin:     General: Skin is warm.   Neurological:      Mental Status: He is alert.   Psychiatric:         Behavior: Behavior normal.         Procedures    Assessment & Plan   Diagnoses and all orders for this visit:    1. Essential hypertension (Primary)  -     Comprehensive " Metabolic Panel; Future  -     Lipid Panel With / Chol / HDL Ratio; Future    2. Type 2 diabetes mellitus without complication, without long-term current use of insulin  -     Hemoglobin A1c; Future    3. TSH elevation  -     TSH; Future  -     T4, Free; Future                 Reviewed bmp, tsh and cbc. BP is good. He sees Dr. Blanc for his PSA.   Return in about 4 months (around 11/21/2025) for Annual physical, Lab Before FUP.